# Patient Record
Sex: MALE | Race: WHITE | NOT HISPANIC OR LATINO | Employment: OTHER | URBAN - METROPOLITAN AREA
[De-identification: names, ages, dates, MRNs, and addresses within clinical notes are randomized per-mention and may not be internally consistent; named-entity substitution may affect disease eponyms.]

---

## 2017-01-12 ENCOUNTER — OFFICE VISIT (OUTPATIENT)
Dept: INTERNAL MEDICINE | Facility: CLINIC | Age: 37
End: 2017-01-12
Attending: INTERNAL MEDICINE
Payer: MEDICAID

## 2017-01-12 VITALS
WEIGHT: 259.5 LBS | OXYGEN SATURATION: 97 % | DIASTOLIC BLOOD PRESSURE: 84 MMHG | HEART RATE: 83 BPM | SYSTOLIC BLOOD PRESSURE: 118 MMHG | BODY MASS INDEX: 37.15 KG/M2 | HEIGHT: 70 IN

## 2017-01-12 DIAGNOSIS — R41.840 DIFFICULTY CONCENTRATING: Primary | ICD-10-CM

## 2017-01-12 DIAGNOSIS — L40.9 PSORIASIS: ICD-10-CM

## 2017-01-12 PROCEDURE — 99213 OFFICE O/P EST LOW 20 MIN: CPT | Mod: S$PBB,,, | Performed by: INTERNAL MEDICINE

## 2017-01-12 PROCEDURE — 99999 PR PBB SHADOW E&M-EST. PATIENT-LVL IV: CPT | Mod: PBBFAC,,, | Performed by: INTERNAL MEDICINE

## 2017-01-12 PROCEDURE — 99214 OFFICE O/P EST MOD 30 MIN: CPT | Mod: PBBFAC | Performed by: INTERNAL MEDICINE

## 2017-01-12 RX ORDER — CLOBETASOL PROPIONATE 0.5 MG/G
CREAM TOPICAL 2 TIMES DAILY
Qty: 15 G | Refills: 1 | Status: SHIPPED | OUTPATIENT
Start: 2017-01-12 | End: 2019-05-16 | Stop reason: SDUPTHER

## 2017-01-12 RX ORDER — PREDNISONE 10 MG/1
10 TABLET ORAL DAILY
Qty: 22 TABLET | Refills: 0 | Status: SHIPPED | OUTPATIENT
Start: 2017-01-12 | End: 2017-01-12 | Stop reason: SDUPTHER

## 2017-01-12 RX ORDER — PREDNISONE 10 MG/1
10 TABLET ORAL DAILY
Qty: 22 TABLET | Refills: 0 | Status: SHIPPED | OUTPATIENT
Start: 2017-01-12 | End: 2017-01-22

## 2017-01-12 NOTE — PROGRESS NOTES
"Subjective:       Patient ID: Wilver Lara is a 36 y.o. male.    Chief Complaint: Rash    HPI Comments: Here for routine f/u    Psoriasis   -has been flaring up for the past week on knees, elbows, and groin. He has had difficulty sleeping due to pain waking him up. He has not been using topical steroids as of late.    Also reports frequent forgetfulness and concentration issues. Would like to be tested for ADD      Review of Systems    Objective:      Vitals:    01/12/17 1509   BP: 118/84   Pulse: 83   SpO2: 97%   Weight: 117.7 kg (259 lb 7.7 oz)   Height: 5' 10" (1.778 m)      Physical Exam   Constitutional: He is oriented to person, place, and time. He appears well-developed and well-nourished. He does not have a sickly appearance. No distress.   HENT:   Head: Normocephalic and atraumatic.   Mouth/Throat: Oropharynx is clear and moist. No oropharyngeal exudate.   Eyes: Conjunctivae and EOM are normal. Pupils are equal, round, and reactive to light. Right eye exhibits no discharge. Left eye exhibits no discharge. No scleral icterus.   Neck: No thyromegaly present.   Cardiovascular: Normal rate, regular rhythm and normal heart sounds.    No murmur heard.  Pulmonary/Chest: Effort normal and breath sounds normal. No respiratory distress. He has no wheezes. He has no rales.   Abdominal: Soft. Normal appearance. He exhibits no distension. There is no tenderness.   Musculoskeletal: He exhibits no edema or tenderness.   Lymphadenopathy:     He has no cervical adenopathy.   Neurological: He is alert and oriented to person, place, and time.   Skin: Skin is warm and dry. He is not diaphoretic.   Diffuse scaling with erythematous base involving knees and elbows.   Psychiatric: He has a normal mood and affect. His speech is normal and behavior is normal.       Assessment:       1. Difficulty concentrating    2. Psoriasis        Plan:       Wilver was seen today for rash.    Diagnoses and all orders for this " visit:    Difficulty concentrating  -     Ambulatory consult to Psychiatry    Psoriasis  -given paper Rx for PO steroids in event symptoms are this severe over weekend and do not respond to topical  -     clobetasol (TEMOVATE) 0.05 % cream; Apply topically 2 (two) times daily.    -     Ambulatory referral to Dermatology               Side effects of medication(s) were discussed in detail and patient voiced understanding.  Patient will call back for any issues or complications.

## 2017-03-08 ENCOUNTER — HOSPITAL ENCOUNTER (OUTPATIENT)
Dept: RADIOLOGY | Facility: HOSPITAL | Age: 37
Discharge: HOME | End: 2017-03-08
Attending: INTERNAL MEDICINE
Payer: MEDICAID

## 2017-03-08 ENCOUNTER — OFFICE VISIT (OUTPATIENT)
Dept: HEMATOLOGY/ONCOLOGY | Facility: CLINIC | Age: 37
End: 2017-03-08
Payer: MEDICAID

## 2017-03-08 VITALS
RESPIRATION RATE: 20 BRPM | OXYGEN SATURATION: 99 % | WEIGHT: 253.5 LBS | SYSTOLIC BLOOD PRESSURE: 145 MMHG | HEART RATE: 70 BPM | TEMPERATURE: 98 F | BODY MASS INDEX: 36.38 KG/M2 | DIASTOLIC BLOOD PRESSURE: 89 MMHG

## 2017-03-08 DIAGNOSIS — R16.1 SPLENOMEGALY: Primary | ICD-10-CM

## 2017-03-08 DIAGNOSIS — R16.1 SPLENOMEGALY: ICD-10-CM

## 2017-03-08 PROCEDURE — 76700 US EXAM ABDOM COMPLETE: CPT | Mod: 26,,, | Performed by: RADIOLOGY

## 2017-03-08 PROCEDURE — 99999 PR PBB SHADOW E&M-EST. PATIENT-LVL III: CPT | Mod: PBBFAC,,, | Performed by: INTERNAL MEDICINE

## 2017-03-08 PROCEDURE — 99213 OFFICE O/P EST LOW 20 MIN: CPT | Mod: S$PBB,,, | Performed by: INTERNAL MEDICINE

## 2017-03-08 PROCEDURE — 99213 OFFICE O/P EST LOW 20 MIN: CPT | Mod: PBBFAC | Performed by: INTERNAL MEDICINE

## 2017-03-08 NOTE — PROGRESS NOTES
Subjective:       Patient ID: Wilver Lara is a 36 y.o. male.    Chief Complaint: Follow-up    HPI     Returns after scans- spleen stable - no other findings  Doing well    Findings:    Pancreas:  Normal.     Liver:  Normal in size.  The liver demonstrates homogeneous echotexture.  No focal hepatic lesions are seen.    Biliary system:  Gallbladder demonstrates a stable 5 mm echogenic, nonmobile focus along the gallbladder wall consistent with a polyp.  A small amount of layering sludge and a few tiny stones are visualized.  The common duct is not dilated, measuring 3 mm.  No gallbladder wall thickening.No sonographic Nguyen sign.  No dilated intrahepatic radicles are seen.  No pericholecystic fluid.    Spleen:  Slightly enlarged in size measuring 13.4 x 4.7 cm with a homogeneous echotexture.    Aorta:  No aneurysm.      Inferior vena cava:  Normal in appearance.    Right kidney:  Normal in size with no hydronephrosis.      Left kidney:  Surgically absent.  No evidence of mass in the left renal fossa.      Miscellaneous:  No ascites.   Impression       Stable gallbladder wall polyp measuring 5 mm.    A small amount of biliary sludge and a few tiny gallstones.    Mild splenomegaly.    Status post left nephrectomy.       Additional scans:  Repeat CT scan 9/6/16  Comparison: Ultrasound abdomen 6/21/16.  The visualized lower thorax shows no lung consolidation or pleural effusion.  The spleen is mildly enlarged with homogeneous parenchyma.  The 2 splenules are noted at the hilum.  The liver, gallbladder, bile ducts, portal vein, and pancreas are unremarkable.  Both adrenal glands are present and unremarkable.  Postoperative changes are identified from left nephrectomy.  The surgical bed is unremarkable with no sign of recurrence.  The right kidney is normal size and excretes contrast satisfactorily without evidence of stone, obstruction, or mass.  The right ureter and urinary bladder and prostate are  unremarkable.  The abdominal aorta tapers normally with all the major branch vessels patent.  No significant ascites or lymph node enlargement is seen in the abdomen or pelvis.  The intestinal tract shows no obstruction or acute inflammatory process.  Normal appendix is visualized.  The skeletal structures are intact.  No osseous metastatic disease is seen.  The muscles of the left flank show distortion and defect with herniation of abdominal fat and the descending colon into the area.     We originally saw in consultation for splenomegaly on ultarsound  Weight stable. Patient reports no fevers, chills, or night sweats.  No lymphadenopathy or itching noted.  Energy level unchanged.  Prior lab work-up negative.      He has a prior history of an incidentally found renal cell cancer for which he underwent left nephrectomy on 6/3/15- required open procedure due to depth and size of tumor  Pathology revealed Stage: I (pT1b pNX)  1. KIDNEY: RENAL CELL (CLEAR CELL) CARCINOMA (4.5 CM); RESECTION MARGINS FREE OF  MALIGNANCY.  2. ADIPOSE TISSUE: FAT NECROSIS, FIBROSIS, AND HEMOSIDERIN DEPOSITS; NO EVIDENCE OF  MALIGNANCY.  6/26/15 U/S- there is mild splenomegaly measuring up to 13.9 cm.  9/25/15: 1. Mild splenomegaly- 13.7 cm  2. Findings that likely represent 4-mm gallbladder polyp.  3.  Patient status post left nephrectomy  He was advised to see general surgeon at that time  12/14/15- 13.7 cm SM  6/21/15- 1. Stable borderline splenomegaly.  2.  Left nephrectomy.  3.  Stable gallbladder polyp.    Review of Systems   Constitutional: Negative for activity change, appetite change, chills, diaphoresis, fatigue, fever and unexpected weight change.   HENT: Negative for congestion, ear pain, mouth sores, nosebleeds, postnasal drip, rhinorrhea, sinus pressure, sneezing, sore throat, tinnitus and trouble swallowing.    Eyes: Negative for redness and visual disturbance.   Respiratory: Negative for cough, chest tightness, shortness of  breath and wheezing.    Cardiovascular: Negative for chest pain, palpitations and leg swelling.   Gastrointestinal: Negative for abdominal distention, abdominal pain, blood in stool, constipation, diarrhea, nausea and vomiting.   Endocrine: Negative for cold intolerance and heat intolerance.   Genitourinary: Negative for decreased urine volume, difficulty urinating, dysuria, frequency and urgency.   Musculoskeletal: Negative for arthralgias, back pain, gait problem, joint swelling, myalgias, neck pain and neck stiffness.   Skin: Negative for color change, pallor, rash and wound.   Neurological: Negative for dizziness, weakness, light-headedness, numbness and headaches.   Hematological: Negative for adenopathy. Does not bruise/bleed easily.   Psychiatric/Behavioral: Negative for decreased concentration and dysphoric mood. The patient is not nervous/anxious.        Objective:      Physical Exam   Constitutional: He is oriented to person, place, and time. He appears well-developed and well-nourished. No distress.   Presents alone   HENT:   Head: Normocephalic and atraumatic.   Right Ear: External ear normal.   Left Ear: External ear normal.   Nose: Nose normal.   Mouth/Throat: Oropharynx is clear and moist. No oropharyngeal exudate.   Eyes: Conjunctivae and EOM are normal. Pupils are equal, round, and reactive to light. Left eye exhibits no discharge. No scleral icterus.   Neck: Normal range of motion. Neck supple. No tracheal deviation present. No thyromegaly present.   Cardiovascular: Normal rate, regular rhythm, normal heart sounds and intact distal pulses.  Exam reveals no gallop and no friction rub.    No murmur heard.  Pulmonary/Chest: Effort normal and breath sounds normal. No respiratory distress. He has no wheezes. He has no rales. He exhibits no tenderness.   Abdominal: Soft. Bowel sounds are normal. He exhibits no distension and no mass. There is no tenderness. There is no rebound and no guarding.   No  organomegaly   Musculoskeletal: He exhibits no edema or tenderness.   Lymphadenopathy:     He has no cervical adenopathy.     He has no axillary adenopathy.        Right: No inguinal and no supraclavicular adenopathy present.        Left: No inguinal and no supraclavicular adenopathy present.   Neurological: He is alert and oriented to person, place, and time. No cranial nerve deficit. He exhibits normal muscle tone. Coordination normal.   Skin: Skin is warm and dry. No rash noted. He is not diaphoretic. No erythema. No pallor.   Psychiatric: He has a normal mood and affect. His behavior is normal. Judgment and thought content normal.   Nursing note and vitals reviewed.    Labs- reviewed  Assessment:       1. Splenomegaly        Plan:     Stable findings  No further follow-up needed  Will follow with urology regarding RCC

## 2017-05-23 ENCOUNTER — HOSPITAL ENCOUNTER (EMERGENCY)
Facility: OTHER | Age: 37
Discharge: HOME | End: 2017-05-23
Attending: EMERGENCY MEDICINE
Payer: MEDICAID

## 2017-05-23 VITALS
TEMPERATURE: 99 F | RESPIRATION RATE: 18 BRPM | WEIGHT: 250 LBS | BODY MASS INDEX: 35.79 KG/M2 | DIASTOLIC BLOOD PRESSURE: 83 MMHG | HEART RATE: 94 BPM | OXYGEN SATURATION: 98 % | HEIGHT: 70 IN | SYSTOLIC BLOOD PRESSURE: 130 MMHG

## 2017-05-23 DIAGNOSIS — R53.83 FATIGUE, UNSPECIFIED TYPE: ICD-10-CM

## 2017-05-23 DIAGNOSIS — R35.0 URINARY FREQUENCY: Primary | ICD-10-CM

## 2017-05-23 LAB
ALBUMIN SERPL BCP-MCNC: 3.6 G/DL
ALP SERPL-CCNC: 97 U/L
ALT SERPL W/O P-5'-P-CCNC: 28 U/L
ANION GAP SERPL CALC-SCNC: 11 MMOL/L
AST SERPL-CCNC: 19 U/L
BASOPHILS # BLD AUTO: 0.03 K/UL
BASOPHILS NFR BLD: 0.2 %
BILIRUB SERPL-MCNC: 0.5 MG/DL
BILIRUB UR QL STRIP: NEGATIVE
BUN SERPL-MCNC: 12 MG/DL
CALCIUM SERPL-MCNC: 9.1 MG/DL
CHLORIDE SERPL-SCNC: 106 MMOL/L
CK SERPL-CCNC: 177 U/L
CLARITY UR: CLEAR
CO2 SERPL-SCNC: 22 MMOL/L
COLOR UR: YELLOW
CREAT SERPL-MCNC: 1.1 MG/DL
DIFFERENTIAL METHOD: ABNORMAL
EOSINOPHIL # BLD AUTO: 0.4 K/UL
EOSINOPHIL NFR BLD: 2.8 %
ERYTHROCYTE [DISTWIDTH] IN BLOOD BY AUTOMATED COUNT: 13 %
EST. GFR  (AFRICAN AMERICAN): >60 ML/MIN/1.73 M^2
EST. GFR  (NON AFRICAN AMERICAN): >60 ML/MIN/1.73 M^2
GLUCOSE SERPL-MCNC: 110 MG/DL
GLUCOSE UR QL STRIP: NEGATIVE
HCT VFR BLD AUTO: 43.8 %
HGB BLD-MCNC: 14.7 G/DL
HGB UR QL STRIP: ABNORMAL
KETONES UR QL STRIP: NEGATIVE
LEUKOCYTE ESTERASE UR QL STRIP: NEGATIVE
LYMPHOCYTES # BLD AUTO: 1.4 K/UL
LYMPHOCYTES NFR BLD: 11.2 %
MCH RBC QN AUTO: 29 PG
MCHC RBC AUTO-ENTMCNC: 33.6 %
MCV RBC AUTO: 86 FL
MONOCYTES # BLD AUTO: 0.9 K/UL
MONOCYTES NFR BLD: 7 %
NEUTROPHILS # BLD AUTO: 10 K/UL
NEUTROPHILS NFR BLD: 78.5 %
NITRITE UR QL STRIP: NEGATIVE
PH UR STRIP: 6 [PH] (ref 5–8)
PLATELET # BLD AUTO: 255 K/UL
PMV BLD AUTO: 9.5 FL
POTASSIUM SERPL-SCNC: 4.3 MMOL/L
PROT SERPL-MCNC: 8 G/DL
PROT UR QL STRIP: ABNORMAL
RBC # BLD AUTO: 5.07 M/UL
SODIUM SERPL-SCNC: 139 MMOL/L
SP GR UR STRIP: >=1.03 (ref 1–1.03)
URN SPEC COLLECT METH UR: ABNORMAL
UROBILINOGEN UR STRIP-ACNC: ABNORMAL EU/DL
WBC # BLD AUTO: 12.74 K/UL

## 2017-05-23 PROCEDURE — 80053 COMPREHEN METABOLIC PANEL: CPT

## 2017-05-23 PROCEDURE — 25000003 PHARM REV CODE 250: Performed by: PHYSICIAN ASSISTANT

## 2017-05-23 PROCEDURE — 85025 COMPLETE CBC W/AUTO DIFF WBC: CPT

## 2017-05-23 PROCEDURE — 87086 URINE CULTURE/COLONY COUNT: CPT

## 2017-05-23 PROCEDURE — 96360 HYDRATION IV INFUSION INIT: CPT

## 2017-05-23 PROCEDURE — 99283 EMERGENCY DEPT VISIT LOW MDM: CPT | Mod: 25

## 2017-05-23 PROCEDURE — 81003 URINALYSIS AUTO W/O SCOPE: CPT

## 2017-05-23 PROCEDURE — 82550 ASSAY OF CK (CPK): CPT

## 2017-05-23 RX ORDER — SODIUM CHLORIDE 9 MG/ML
1000 INJECTION, SOLUTION INTRAVENOUS
Status: COMPLETED | OUTPATIENT
Start: 2017-05-23 | End: 2017-05-23

## 2017-05-23 RX ADMIN — SODIUM CHLORIDE 1000 ML: 0.9 INJECTION, SOLUTION INTRAVENOUS at 05:05

## 2017-05-23 RX ADMIN — SODIUM CHLORIDE 1000 ML: 0.9 INJECTION, SOLUTION INTRAVENOUS at 06:05

## 2017-05-23 NOTE — ED TRIAGE NOTES
Pt reports fatigue and frequent urination since yesterday. Reports prior UTI 1 yr and 4 months ago and reports similar feeling. Reports generalized body aches.

## 2017-05-23 NOTE — ED PROVIDER NOTES
"Encounter Date: 5/23/2017       History     Chief Complaint   Patient presents with    Urinary Frequency     pt reports "I think I'm getting a UTI. The last time this happened I got admitted for about 7 days." denies any dysuria/hematuria; denies any groin pain; reports increase in urinary frequency and c/o feeling fatigue     Review of patient's allergies indicates:  No Known Allergies  Patient is a 36 y.o. male with a past medical history of left-sided renal cell carcinoma status post nephrectomy, presenting to the emergency department with complaints of fatigue, urinary frequency. The patient reports his symptoms started yesterday.  He reports that he has significant urinary frequency with no hematuria or dysuria.  He also states that he has generalized body aches and fatigue.  He reports he is concerned because the last time he had the symptoms in January 2016, he went to [facility] where he was found to have a very significant urinary tract infection and required a 7 day hospital stay. He denies any pain at this time.  He denies fever, chills, nausea, vomiting, diarrhea. He reports that Dr. Mariscal is his urologist, he is due to see him in 2 months.       The history is provided by the patient.     Past Medical History:   Diagnosis Date    Cancer     left kidney mass     Past Surgical History:   Procedure Laterality Date    ANKLE SURGERY Left     bone spurs    NEPHRECTOMY      rt knee meniscus       History reviewed. No pertinent family history.  Social History   Substance Use Topics    Smoking status: Never Smoker    Smokeless tobacco: Not on file    Alcohol use No     Review of Systems   Constitutional: Positive for fatigue. Negative for activity change, chills and fever.   HENT: Negative for congestion, rhinorrhea and sore throat.    Eyes: Negative for photophobia and visual disturbance.   Respiratory: Negative for cough and shortness of breath.    Cardiovascular: Negative for chest " pain.   Gastrointestinal: Negative for abdominal pain, diarrhea, nausea and vomiting.   Genitourinary: Positive for frequency. Negative for dysuria, hematuria and urgency.   Musculoskeletal: Positive for myalgias. Negative for back pain and neck pain.   Skin: Negative for color change and wound.   Neurological: Negative for weakness and headaches.   Psychiatric/Behavioral: Negative for agitation and confusion.       Physical Exam     Initial Vitals [05/23/17 1608]   BP Pulse Resp Temp SpO2   (!) 148/84 100 18 97.9 °F (36.6 °C) 97 %     Physical Exam    Nursing note and vitals reviewed.  Constitutional: He appears well-developed and well-nourished. He is not diaphoretic. He is cooperative.  Non-toxic appearance. He does not have a sickly appearance. No distress.   Well appearing,  male unaccompanied in the emergency department.  He speaking in clear and full sentences.  He is in no acute distress.  He ambulates without that difficulty.   HENT:   Head: Normocephalic and atraumatic.   Right Ear: External ear normal.   Left Ear: External ear normal.   Nose: Nose normal.   Mouth/Throat: Oropharynx is clear and moist.   Eyes: Conjunctivae and EOM are normal.   Neck: Normal range of motion. Neck supple.   Cardiovascular: Regular rhythm and normal heart sounds. Tachycardia present.    Pulmonary/Chest: Breath sounds normal. No respiratory distress. He has no wheezes.   Abdominal: Soft. Bowel sounds are normal. He exhibits no distension. There is no tenderness. There is no rebound, no guarding and no CVA tenderness.   Musculoskeletal: Normal range of motion.   Neurological: He is alert and oriented to person, place, and time. GCS eye subscore is 4. GCS verbal subscore is 5. GCS motor subscore is 6.   Skin: Skin is warm.   Psychiatric: He has a normal mood and affect. His behavior is normal. Judgment and thought content normal.         ED Course   Procedures  Labs Reviewed   URINALYSIS - Abnormal; Notable for the  following:        Result Value    Specific Gravity, UA >=1.030 (*)     Protein, UA Trace (*)     Occult Blood UA Trace (*)     Urobilinogen, UA 2.0-3.0 (*)     All other components within normal limits   CBC W/ AUTO DIFFERENTIAL - Abnormal; Notable for the following:     WBC 12.74 (*)     Gran # 10.0 (*)     Gran% 78.5 (*)     Lymph% 11.2 (*)     All other components within normal limits   COMPREHENSIVE METABOLIC PANEL - Abnormal; Notable for the following:     CO2 22 (*)     All other components within normal limits   CULTURE, URINE   CK             Medical Decision Making:   History:   Old Medical Records: I decided to obtain old medical records.  Old Records Summarized: other records.       <> Summary of Records: Reviewed medical records in epic including discharge summary from 8/24/16 where patient was admitted for ZULLY with rhabdo.  Initial Assessment:   Urgent evaluation of a 36 y.o. male with a past medical history of left renal cell carcinoma status post nephrectomy, presenting to the emergency department with complaints of urinary frequency, fatigue and myalgias x2 days. Physical exam reveals mild tachycardia, lungs are clear to auscultation bilaterally.  Abdomen is soft nontender with no CVA tenderness.  Given the patient's history and physical exam finds, will obtain UA, basic blood work including CBC, CMP, CK, administer IV fluids and reassess.  Clinical Tests:   Lab Tests: Ordered and Reviewed  The following lab test(s) were unremarkable: CBC, CMP and Urinalysis       <> Summary of Lab: CPK  ED Management:  CBC shows WBC at 12.74, H&H is 14.7/43.7. CPK is 177.  UA shows trace protein with trace blood, no evidence of acute urinary tract infection.  Will send urine culture.  After receiving 2 L of fluids, the patient reported much relief of his symptoms.  He states he feels as though he cannot go home.  We'll plan to discharge home with symptomatic care instructions.  I did advise him to obtain close  follow-up with Dr. Mariscal. The patient was instructed to follow up with a primary care provider in 2 days or to return to the emergency department for worsening symptoms. The treatment plan was discussed with the patient who demonstrated understanding and comfort with plan. The patient's history, physical exam, and plan of care was discussed with and agreed upon with my supervising physician.     Other:   I have discussed this case with another health care provider.       <> Summary of the Discussion: Dr. Banks  This note was created using Dragon Medical Dictation. There may be typographical errors secondary to dictation.                    ED Course     Clinical Impression:     1. Urinary frequency    2. Fatigue, unspecified type       Disposition:   Disposition: Discharged  Condition: Stable       Isabel Beach PA-C  05/23/17 9059

## 2017-05-25 LAB — BACTERIA UR CULT: NORMAL

## 2017-07-10 ENCOUNTER — TELEPHONE (OUTPATIENT)
Dept: UROLOGY | Facility: CLINIC | Age: 37
End: 2017-07-10

## 2017-07-10 DIAGNOSIS — C64.9 CANCER OF KIDNEY, UNSPECIFIED LATERALITY: Primary | ICD-10-CM

## 2017-07-10 NOTE — TELEPHONE ENCOUNTER
----- Message from Natacha Mei sent at 7/10/2017 10:29 AM CDT -----  Contact: Betsy Lara pt's wife  x_  1st Request  _  2nd Request  _  3rd Request        Who: Betsy Lara pt's wife    Why: Patient wife would like a call back in regards to getting orders placed and scheduled for a CT scan, X-ray, and blood work before her 's annual appt on 7/14.     What Number to Call Back: [phone]    When to Expect a call back: (Before the end of the day)   -- if call after 3:00 call back will be tomorrow.

## 2017-07-10 NOTE — TELEPHONE ENCOUNTER
LMOR that all pt needs is CXR and that he can have this done as walk-in at any [facility] facility.

## 2017-07-13 ENCOUNTER — HOSPITAL ENCOUNTER (OUTPATIENT)
Dept: RADIOLOGY | Facility: HOSPITAL | Age: 37
Discharge: HOME | End: 2017-07-13
Attending: UROLOGY
Payer: MEDICAID

## 2017-07-13 DIAGNOSIS — C64.9 CANCER OF KIDNEY, UNSPECIFIED LATERALITY: ICD-10-CM

## 2017-07-13 PROCEDURE — 71020 XR CHEST PA AND LATERAL: CPT | Mod: 26,,, | Performed by: RADIOLOGY

## 2017-07-13 PROCEDURE — 71020 XR CHEST PA AND LATERAL: CPT | Mod: TC

## 2017-07-14 ENCOUNTER — OFFICE VISIT (OUTPATIENT)
Dept: UROLOGY | Facility: CLINIC | Age: 37
End: 2017-07-14
Attending: UROLOGY
Payer: MEDICAID

## 2017-07-14 VITALS
SYSTOLIC BLOOD PRESSURE: 122 MMHG | WEIGHT: 248.56 LBS | DIASTOLIC BLOOD PRESSURE: 79 MMHG | BODY MASS INDEX: 35.58 KG/M2 | HEART RATE: 69 BPM | HEIGHT: 70 IN

## 2017-07-14 DIAGNOSIS — C64.9 CANCER OF KIDNEY, UNSPECIFIED LATERALITY: Primary | ICD-10-CM

## 2017-07-14 PROCEDURE — 99999 PR PBB SHADOW E&M-EST. PATIENT-LVL III: CPT | Mod: PBBFAC,,, | Performed by: UROLOGY

## 2017-07-14 PROCEDURE — 99214 OFFICE O/P EST MOD 30 MIN: CPT | Mod: S$PBB,,, | Performed by: UROLOGY

## 2017-07-14 PROCEDURE — 99213 OFFICE O/P EST LOW 20 MIN: CPT | Mod: PBBFAC | Performed by: UROLOGY

## 2017-07-14 NOTE — PROGRESS NOTES
"Subjective:      Wilver Lara is a 36 y.o. male who returns today regarding his     2 years status post left nephrectomy for renal cell carcinoma.  No symptoms of metastatic disease nor local recurrence.  He has seen hematology for his splenomegaly and no additional workup is needed..    The following portions of the patient's history were reviewed and updated as appropriate: allergies, current medications, past family history, past medical history, past social history, past surgical history and problem list.    Review of Systems  Pertinent items are noted in HPI.  A comprehensive multipoint review of systems was negative except as otherwise stated in the HPI.     Objective:   Vitals: /79   Pulse 69   Ht 5' 10" (1.778 m)   Wt 112.8 kg (248 lb 9.1 oz)   BMI 35.67 kg/m²     Physical Exam   General: alert and oriented, no acute distress  Respiratory: Symmetric expansion, non-labored breathing  Cardiovascular: no peripheral edema  Abdomen: soft, non distended  Skin: normal coloration and turgor, no rashes, no suspicious skin lesions noted  Neuro: no gross deficits  Psych: normal judgment and insight, normal mood/affect and non-anxious    Physical Exam    Lab Review   Urinalysis demonstrates   Lab Results   Component Value Date    WBC 12.74 (H) 05/23/2017    HGB 14.7 05/23/2017    HCT 43.8 05/23/2017    MCV 86 05/23/2017     05/23/2017     Lab Results   Component Value Date    CREATININE 1.1 05/23/2017    BUN 12 05/23/2017     Function tests normal  Imaging    Chest x-ray no metastases   abdominal ultrasound no evidence of recurrence or metastatic disease within the abdomen    Assessment and Plan:   Cancer of kidney, unspecified laterality  grade I clear cell [id number] sp left nephrectomy YANA  -     US Abdomen Complete; Future; Expected date: 07/14/2017  -     X-Ray Chest PA And Lateral; Future; Expected date: 07/14/2017  -     Comprehensive metabolic panel; Future; Expected date: 07/14/2017  -     " CBC auto differential; Future; Expected date: 07/14/2017      Gallbladder polyp  Follow-up with Dr. Hess his primary physician for gallbladder polyp, splenomegaly and other non-urologic issues  With a solitary kidney, he should be monitored for hypertension and proteinuria and consider nephrology referral if he develops any of these

## 2018-01-05 ENCOUNTER — OFFICE VISIT (OUTPATIENT)
Dept: URGENT CARE | Facility: CLINIC | Age: 38
End: 2018-01-05
Payer: MEDICAID

## 2018-01-05 VITALS
DIASTOLIC BLOOD PRESSURE: 79 MMHG | SYSTOLIC BLOOD PRESSURE: 126 MMHG | BODY MASS INDEX: 35.79 KG/M2 | WEIGHT: 250 LBS | RESPIRATION RATE: 19 BRPM | HEIGHT: 70 IN | TEMPERATURE: 98 F | HEART RATE: 84 BPM | OXYGEN SATURATION: 98 %

## 2018-01-05 DIAGNOSIS — J40 PRODUCTIVE BRONCHITIS: Primary | ICD-10-CM

## 2018-01-05 PROCEDURE — 99214 OFFICE O/P EST MOD 30 MIN: CPT | Mod: S$GLB,,, | Performed by: PHYSICIAN ASSISTANT

## 2018-01-05 RX ORDER — AZITHROMYCIN 250 MG/1
TABLET, FILM COATED ORAL
Qty: 6 TABLET | Refills: 0 | Status: SHIPPED | OUTPATIENT
Start: 2018-01-05 | End: 2018-01-10

## 2018-01-05 RX ORDER — PROMETHAZINE HYDROCHLORIDE AND DEXTROMETHORPHAN HYDROBROMIDE 6.25; 15 MG/5ML; MG/5ML
5 SYRUP ORAL
Qty: 118 ML | Refills: 0 | Status: SHIPPED | OUTPATIENT
Start: 2018-01-05 | End: 2018-01-12

## 2018-01-05 NOTE — PROGRESS NOTES
"Subjective:       Patient ID: Wilver Lara is a 37 y.o. male.    Vitals:  height is 5' 10" (1.778 m) and weight is 113.4 kg (250 lb). His temperature is 97.6 °F (36.4 °C). His blood pressure is 126/79 and his pulse is 84. His respiration is 19 and oxygen saturation is 98%.     Chief Complaint: Cough    Cough   This is a new problem. The current episode started more than 1 month ago. The problem has been unchanged. The problem occurs every few minutes. The cough is productive of sputum. Associated symptoms include ear pain, headaches and myalgias. Pertinent negatives include no chest pain, chills, eye redness, fever, sore throat, shortness of breath or wheezing. The symptoms are aggravated by lying down. Treatments tried: mucinex. The treatment provided no relief.     Review of Systems   Constitution: Negative for chills, fever and malaise/fatigue.   HENT: Positive for congestion and ear pain. Negative for hoarse voice and sore throat.    Eyes: Negative for discharge and redness.   Cardiovascular: Negative for chest pain, dyspnea on exertion and leg swelling.   Respiratory: Positive for cough. Negative for shortness of breath, sputum production and wheezing.    Musculoskeletal: Positive for myalgias.   Gastrointestinal: Negative for abdominal pain and nausea.   Neurological: Positive for headaches.       Objective:      Physical Exam   Constitutional: He is oriented to person, place, and time. He appears well-developed and well-nourished. He is cooperative.  Non-toxic appearance. He does not appear ill. No distress.   HENT:   Head: Normocephalic and atraumatic.   Right Ear: Hearing, tympanic membrane, external ear and ear canal normal.   Left Ear: Hearing, tympanic membrane, external ear and ear canal normal.   Nose: Mucosal edema present. No rhinorrhea or nasal deformity. No epistaxis. Right sinus exhibits no maxillary sinus tenderness and no frontal sinus tenderness. Left sinus exhibits no maxillary sinus " tenderness and no frontal sinus tenderness.   Mouth/Throat: Uvula is midline, oropharynx is clear and moist and mucous membranes are normal. No trismus in the jaw. Normal dentition. No uvula swelling. No posterior oropharyngeal erythema.   Eyes: Conjunctivae and lids are normal. No scleral icterus.   Sclera clear bilat   Neck: Trachea normal, full passive range of motion without pain and phonation normal. Neck supple. No neck rigidity.   Cardiovascular: Normal rate, regular rhythm, normal heart sounds, intact distal pulses and normal pulses.    Pulmonary/Chest: Effort normal and breath sounds normal. No respiratory distress.   Productive cough on exam   Musculoskeletal: Normal range of motion. He exhibits no edema or deformity.   Neurological: He is alert and oriented to person, place, and time. He exhibits normal muscle tone. Coordination normal.   Skin: Skin is warm, dry and intact. He is not diaphoretic. No pallor.   Psychiatric: He has a normal mood and affect. His speech is normal and behavior is normal. Judgment and thought content normal. Cognition and memory are normal.   Nursing note and vitals reviewed.      Assessment:       1. Productive bronchitis        Plan:         Productive bronchitis  -     azithromycin (Z-MYRA) 250 MG tablet; Take 2 tablets by mouth on day 1; Take 1 tablet by mouth on days 2-5  Dispense: 6 tablet; Refill: 0  -     promethazine-dextromethorphan (PROMETHAZINE-DM) 6.25-15 mg/5 mL Syrp; Take 5 mLs by mouth every 4 to 6 hours as needed.  Dispense: 118 mL; Refill: 0          Patient Instructions     Please return here or go to the Emergency Department for any concerns or worsening of condition.  If you were prescribed antibiotics, please take them to completion.  If you were prescribed a narcotic medication, do not drive or operate heavy equipment or machinery while taking these medications.  Please follow up with your primary care doctor or specialist as needed.    If you  smoke,  please stop smoking.    Symptomatic treatment:      Acute Bronchitis  Your healthcare provider has told you that you have acute bronchitis. Bronchitis is infection or inflammation of the bronchial tubes (airways in the lungs). Normally, air moves easily in and out of the airways. Bronchitis narrows the airways, making it harder for air to flow in and out of the lungs. This causes symptoms such as shortness of breath, coughing up yellow or green mucus, and wheezing. Bronchitis can be acute or chronic. Acute means the condition comes on quickly and goes away in a short time, usually within 3 to 10 days. Chronic means a condition lasts a long time and often comes back.    What causes acute bronchitis?  Acute bronchitis almost always starts as a viral respiratory infection, such as a cold or the flu. Certain factors make it more likely for a cold or flu to turn into bronchitis. These include being very young, being elderly, having a heart or lung problem, or having a weak immune system. Cigarette smoking also makes bronchitis more likely.  When bronchitis develops, the airways become swollen. The airways may also become infected with bacteria. This is known as a secondary infection.  Diagnosing acute bronchitis  Your healthcare provider will examine you and ask about your symptoms and health history. You may also have a sputum culture to test the fluid in your lungs. Chest X-rays may be done to look for infection in the lungs.  Treating acute bronchitis  Bronchitis usually clears up as the cold or flu goes away. You can help feel better faster by doing the following:  · Take medicine as directed. You may be told to take ibuprofen or other over-the-counter medicines. These help relieve inflammation in your bronchial tubes. Your healthcare provider may prescribe an inhaler to help open up the bronchial tubes. Most of the time, acute bronchitis is caused by a viral infection. Antibiotics are usually not prescribed for  viral infections.  · Drink plenty of fluids, such as water, juice, or warm soup. Fluids loosen mucus so that you can cough it up. This helps you breathe more easily. Fluids also prevent dehydration.  · Make sure you get plenty of rest.  · Do not smoke. Do not allow anyone else to smoke in your home.  Recovery and follow-up  Follow up with your doctor as you are told. You will likely feel better in a week or two. But a dry cough can linger beyond that time. Let your doctor know if you still have symptoms (other than a dry cough) after 2 weeks, or if youre prone to getting bronchial infections. Take steps to protect yourself from future infections. These steps include stopping smoking and avoiding tobacco smoke, washing your hands often, and getting a yearly flu shot.  When to call your healthcare provider  Call the healthcare provider if you have any of the following:  · Fever of 100.4°F (38.0°C) or higher, or as advised  · Symptoms that get worse, or new symptoms  · Trouble breathing  · Symptoms that dont start to improve within a week, or within 3 days of taking antibiotics   Date Last Reviewed: 12/1/2016  © 3717-3325 [employer]. [address] [place] [address], [place] [address]. All rights reserved. This information is not intended as a substitute for professional medical care. Always follow your healthcare professional's instructions.

## 2018-01-05 NOTE — PATIENT INSTRUCTIONS
Please return here or go to the Emergency Department for any concerns or worsening of condition.  If you were prescribed antibiotics, please take them to completion.  If you were prescribed a narcotic medication, do not drive or operate heavy equipment or machinery while taking these medications.  Please follow up with your primary care doctor or specialist as needed.    If you  smoke, please stop smoking.    Symptomatic treatment:      Acute Bronchitis  Your healthcare provider has told you that you have acute bronchitis. Bronchitis is infection or inflammation of the bronchial tubes (airways in the lungs). Normally, air moves easily in and out of the airways. Bronchitis narrows the airways, making it harder for air to flow in and out of the lungs. This causes symptoms such as shortness of breath, coughing up yellow or green mucus, and wheezing. Bronchitis can be acute or chronic. Acute means the condition comes on quickly and goes away in a short time, usually within 3 to 10 days. Chronic means a condition lasts a long time and often comes back.    What causes acute bronchitis?  Acute bronchitis almost always starts as a viral respiratory infection, such as a cold or the flu. Certain factors make it more likely for a cold or flu to turn into bronchitis. These include being very young, being elderly, having a heart or lung problem, or having a weak immune system. Cigarette smoking also makes bronchitis more likely.  When bronchitis develops, the airways become swollen. The airways may also become infected with bacteria. This is known as a secondary infection.  Diagnosing acute bronchitis  Your healthcare provider will examine you and ask about your symptoms and health history. You may also have a sputum culture to test the fluid in your lungs. Chest X-rays may be done to look for infection in the lungs.  Treating acute bronchitis  Bronchitis usually clears up as the cold or flu goes away. You can help feel better  faster by doing the following:  · Take medicine as directed. You may be told to take ibuprofen or other over-the-counter medicines. These help relieve inflammation in your bronchial tubes. Your healthcare provider may prescribe an inhaler to help open up the bronchial tubes. Most of the time, acute bronchitis is caused by a viral infection. Antibiotics are usually not prescribed for viral infections.  · Drink plenty of fluids, such as water, juice, or warm soup. Fluids loosen mucus so that you can cough it up. This helps you breathe more easily. Fluids also prevent dehydration.  · Make sure you get plenty of rest.  · Do not smoke. Do not allow anyone else to smoke in your home.  Recovery and follow-up  Follow up with your doctor as you are told. You will likely feel better in a week or two. But a dry cough can linger beyond that time. Let your doctor know if you still have symptoms (other than a dry cough) after 2 weeks, or if youre prone to getting bronchial infections. Take steps to protect yourself from future infections. These steps include stopping smoking and avoiding tobacco smoke, washing your hands often, and getting a yearly flu shot.  When to call your healthcare provider  Call the healthcare provider if you have any of the following:  · Fever of 100.4°F (38.0°C) or higher, or as advised  · Symptoms that get worse, or new symptoms  · Trouble breathing  · Symptoms that dont start to improve within a week, or within 3 days of taking antibiotics   Date Last Reviewed: 12/1/2016  © 5439-4795 [employer]. [address] [place] [address], [place] [address]. All rights reserved. This information is not intended as a substitute for professional medical care. Always follow your healthcare professional's instructions.

## 2018-05-21 ENCOUNTER — TELEPHONE (OUTPATIENT)
Dept: UROLOGY | Facility: CLINIC | Age: 38
End: 2018-05-21

## 2018-05-21 NOTE — TELEPHONE ENCOUNTER
----- Message from Vicky Jacobs sent at 5/21/2018 10:57 AM CDT -----  Contact: Betsy Lara   Name of Who is Calling: Betsy Lara       What is the request in detail: Patients wife Betsy states she would like to speak with the staff to schedule her husbands annual check up (No slots were available)...... Please contact to further discuss and advise       Can the clinic reply by [facility]: No    What Number to Call Back if not in [facility]: [phone]

## 2018-05-23 ENCOUNTER — TELEPHONE (OUTPATIENT)
Dept: UROLOGY | Facility: CLINIC | Age: 38
End: 2018-05-23

## 2018-05-23 NOTE — TELEPHONE ENCOUNTER
----- Message from Lilliana Reardon sent at 5/23/2018  2:15 PM CDT -----  Contact: wife            Name of Who is Calling: ERIC MARKS [[id number]]      What is the request in detail: pt's wife calling because pt loosing insurance .. Pt would like to be seen for yearly sooner.. Please advise      Can the clinic reply by [facility]: no    What [facility]: [phone]

## 2018-06-06 ENCOUNTER — LAB VISIT (OUTPATIENT)
Dept: LAB | Facility: HOSPITAL | Age: 38
End: 2018-06-06
Attending: UROLOGY
Payer: MEDICAID

## 2018-06-06 DIAGNOSIS — C64.9 CANCER OF KIDNEY, UNSPECIFIED LATERALITY: ICD-10-CM

## 2018-06-06 LAB
ALBUMIN SERPL BCP-MCNC: 3.7 G/DL
ALP SERPL-CCNC: 96 U/L
ALT SERPL W/O P-5'-P-CCNC: 48 U/L
ANION GAP SERPL CALC-SCNC: 7 MMOL/L
AST SERPL-CCNC: 28 U/L
BASOPHILS # BLD AUTO: 0.04 K/UL
BASOPHILS NFR BLD: 0.5 %
BILIRUB SERPL-MCNC: 0.6 MG/DL
BUN SERPL-MCNC: 15 MG/DL
CALCIUM SERPL-MCNC: 9.1 MG/DL
CHLORIDE SERPL-SCNC: 108 MMOL/L
CO2 SERPL-SCNC: 24 MMOL/L
CREAT SERPL-MCNC: 1.4 MG/DL
DIFFERENTIAL METHOD: NORMAL
EOSINOPHIL # BLD AUTO: 0.4 K/UL
EOSINOPHIL NFR BLD: 4.6 %
ERYTHROCYTE [DISTWIDTH] IN BLOOD BY AUTOMATED COUNT: 12.9 %
EST. GFR  (AFRICAN AMERICAN): >60 ML/MIN/1.73 M^2
EST. GFR  (NON AFRICAN AMERICAN): >60 ML/MIN/1.73 M^2
GLUCOSE SERPL-MCNC: 116 MG/DL
HCT VFR BLD AUTO: 42.1 %
HGB BLD-MCNC: 14.6 G/DL
LYMPHOCYTES # BLD AUTO: 1.9 K/UL
LYMPHOCYTES NFR BLD: 23.7 %
MCH RBC QN AUTO: 29.4 PG
MCHC RBC AUTO-ENTMCNC: 34.7 G/DL
MCV RBC AUTO: 85 FL
MONOCYTES # BLD AUTO: 0.5 K/UL
MONOCYTES NFR BLD: 6.6 %
NEUTROPHILS # BLD AUTO: 5.2 K/UL
NEUTROPHILS NFR BLD: 64.6 %
PLATELET # BLD AUTO: 263 K/UL
PMV BLD AUTO: 9.6 FL
POTASSIUM SERPL-SCNC: 3.9 MMOL/L
PROT SERPL-MCNC: 7.4 G/DL
RBC # BLD AUTO: 4.96 M/UL
SODIUM SERPL-SCNC: 139 MMOL/L
WBC # BLD AUTO: 8.01 K/UL

## 2018-06-06 PROCEDURE — 36415 COLL VENOUS BLD VENIPUNCTURE: CPT

## 2018-06-06 PROCEDURE — 80053 COMPREHEN METABOLIC PANEL: CPT

## 2018-06-06 PROCEDURE — 85025 COMPLETE CBC W/AUTO DIFF WBC: CPT

## 2018-06-07 ENCOUNTER — OFFICE VISIT (OUTPATIENT)
Dept: UROLOGY | Facility: CLINIC | Age: 38
End: 2018-06-07
Payer: MEDICAID

## 2018-06-07 ENCOUNTER — HOSPITAL ENCOUNTER (OUTPATIENT)
Dept: RADIOLOGY | Facility: HOSPITAL | Age: 38
Discharge: HOME | End: 2018-06-07
Attending: UROLOGY
Payer: MEDICAID

## 2018-06-07 VITALS
SYSTOLIC BLOOD PRESSURE: 126 MMHG | BODY MASS INDEX: 34.42 KG/M2 | HEART RATE: 86 BPM | HEIGHT: 70 IN | WEIGHT: 240.44 LBS | DIASTOLIC BLOOD PRESSURE: 72 MMHG

## 2018-06-07 DIAGNOSIS — C64.2 RENAL CELL CARCINOMA, LEFT: Primary | ICD-10-CM

## 2018-06-07 DIAGNOSIS — C64.9 CANCER OF KIDNEY, UNSPECIFIED LATERALITY: ICD-10-CM

## 2018-06-07 DIAGNOSIS — Z80.51 FAMILY HISTORY OF KIDNEY CANCER: ICD-10-CM

## 2018-06-07 LAB
BILIRUB SERPL-MCNC: ABNORMAL MG/DL
BLOOD URINE, POC: ABNORMAL
COLOR, POC UA: ABNORMAL
GLUCOSE UR QL STRIP: ABNORMAL
KETONES UR QL STRIP: ABNORMAL
LEUKOCYTE ESTERASE URINE, POC: ABNORMAL
NITRITE, POC UA: ABNORMAL
PH, POC UA: 6
PROTEIN, POC: ABNORMAL
SPECIFIC GRAVITY, POC UA: 1.02
UROBILINOGEN, POC UA: ABNORMAL

## 2018-06-07 PROCEDURE — 71046 X-RAY EXAM CHEST 2 VIEWS: CPT | Mod: 26,,, | Performed by: RADIOLOGY

## 2018-06-07 PROCEDURE — 76700 US EXAM ABDOM COMPLETE: CPT | Mod: 26,,, | Performed by: RADIOLOGY

## 2018-06-07 PROCEDURE — 99214 OFFICE O/P EST MOD 30 MIN: CPT | Mod: 25,S$GLB,, | Performed by: UROLOGY

## 2018-06-07 PROCEDURE — 81002 URINALYSIS NONAUTO W/O SCOPE: CPT | Mod: S$GLB,,, | Performed by: UROLOGY

## 2018-06-07 PROCEDURE — 76700 US EXAM ABDOM COMPLETE: CPT | Mod: TC

## 2018-06-07 PROCEDURE — 71046 X-RAY EXAM CHEST 2 VIEWS: CPT | Mod: TC,FY

## 2018-06-07 NOTE — PROGRESS NOTES
"Subjective:      Wilver Lara is a 37 y.o. male who returns today regarding his     Renal cell carcinoma status post nephrectomy approximately 3 years ago.  He's doing well and has no complaints.  No symptoms of local recurrence and no symptoms of metastatic disease.  He is seeing a general surgeon about his gallbladder polyp and has elected observation.    Interestingly his mother just underwent nephrectomy at [facility] by Dr Alfredo Gibbons also for renal cell carcinoma.  There is no known family history of von Hippel-Lindau syndrome nor tuberous sclerosis.  No other family members have renal cell carcinoma.  He's not sure of the type of renal cell carcinoma his mother had.    The following portions of the patient's history were reviewed and updated as appropriate: allergies, current medications, past family history, past medical history, past social history, past surgical history and problem list.    Review of Systems  Pertinent items are noted in HPI.  A comprehensive multipoint review of systems was negative except as otherwise stated in the HPI.     Objective:   Vitals: /72 (BP Location: Right arm, Patient Position: Sitting, BP Method: Medium (Automatic))   Pulse 86   Ht 5' 10" (1.778 m)   Wt 109.1 kg (240 lb 6.6 oz)   BMI 34.50 kg/m²     Physical Exam   General: alert and oriented, no acute distress  Respiratory: Symmetric expansion, non-labored breathing  Cardiovascular: no peripheral edema  Abdomen: non distended   Skin: normal coloration and turgor, no rashes, no suspicious skin lesions noted  Neuro: no gross deficits  Psych: normal judgment and insight, normal mood/affect and non-anxious    Physical Exam    Lab Review   Urinalysis demonstrates dipstick shows trace blood but microscopic urinalysis negative    Liver function test normal glucose mildly elevated  Discussed the importance of diet and weight loss and monitoring his glucose and avoiding diabetes with a solitary kidney  Also " discussed the importance of seeing his primary physician    Lab Results   Component Value Date    WBC 8.01 06/06/2018    HGB 14.6 06/06/2018    HCT 42.1 06/06/2018    MCV 85 06/06/2018     06/06/2018     Lab Results   Component Value Date    CREATININE 1.4 06/06/2018    BUN 15 06/06/2018       Imaging  Chest x-ray no metastases    Abdominal ultrasound stable gallbladder polyp otherwise negative    Assessment and Plan:   Renal cell carcinoma, left  grade I clear cell [id number] sp left nephrectomy YANA  -     POCT URINE DIPSTICK WITHOUT MICROSCOPE  -     Comprehensive metabolic panel; Future; Expected date: 06/07/2018  -     CBC auto differential; Future; Expected date: 06/07/2018  -     US Abdomen Complete; Future; Expected date: 06/07/2018  -     X-Ray Chest PA And Lateral; Future; Expected date: 06/07/2018    Family history of kidney cancer  We discussed the signs and symptoms of von Hippel-Lindau syndrome and tuberous sclerosis.  It doesn't sound like he or his family members have any of these.  He will give me a copy of his mother's pathology report to review.  Discussed referral to genetic counselor or [facility].

## 2018-06-22 ENCOUNTER — TELEPHONE (OUTPATIENT)
Dept: UROLOGY | Facility: CLINIC | Age: 38
End: 2018-06-22

## 2018-06-22 NOTE — TELEPHONE ENCOUNTER
LM for patient's wife stating Dr Mariscal has not reviewed report.  Will make sure he reviews it on Monday 6/25

## 2018-06-22 NOTE — TELEPHONE ENCOUNTER
----- Message from Paige Howard sent at 6/22/2018  9:58 AM CDT -----  Contact: Jessa Lara            Name of Who is Calling: Jessa Lara      What is the request in detail: Pt wife is calling to find out if Dr. Mariscal reviewed pt pathology report that was dropped off three weeks ago.  Please contact pt wife to further discuss and advise.      Can the clinic reply by [facility]: No      What Number to Call Back if not in [facility]: [phone]

## 2018-06-28 ENCOUNTER — TELEPHONE (OUTPATIENT)
Dept: UROLOGY | Facility: CLINIC | Age: 38
End: 2018-06-28

## 2018-06-28 NOTE — TELEPHONE ENCOUNTER
I reviewed his mother's pathology which shows clear cell RCC.  I left a message that I would contact them next week once I have more information.  I suspect both tumors are sporadic but I sent a message to Dr Montana to see what genetic testing resources we have available internally at [facility] to look for VHL, tuberous sclerosis etc.

## 2018-06-28 NOTE — TELEPHONE ENCOUNTER
----- Message from Idania Martin sent at 6/28/2018 11:44 AM CDT -----  Name of Who is Calling: Betsy (Wife)      What is the request in detail: Pt is checking on the status of the pts pathology reports. Jacklyn advised that the  Would review them by 6/25        Can the clinic reply by [facility]:    No       What Number to Call Back if not in [facility]:[phone]

## 2018-07-02 ENCOUNTER — TELEPHONE (OUTPATIENT)
Dept: UROLOGY | Facility: CLINIC | Age: 38
End: 2018-07-02

## 2018-07-02 NOTE — TELEPHONE ENCOUNTER
----- Message from Arron Izquierdo MD sent at 6/29/2018  8:05 AM CDT -----  We have no adult geneticists at [facility], but we have an excellent service called [facility], which provides telehealth geneticists and genetic counselors.  Sounds hokey, but is actually very, very good.  There is just a form to fill out to get the patient an appt.  The other alternative is to refer to the [facility].  Thanks!  -Arron    ----- Message -----  From: Sunil Montana MD  Sent: 6/29/2018  12:25 AM  To: MD Rodrigo Reyna,    Please see below.  Any suggestions?  Thanks    aLndon  ----- Message -----  From: Kole Mariscal MD  Sent: 6/28/2018   3:34 PM  To: MD Landon Wild,    This patient had a clear cell RCC when he was about 35 year old.  His mother also had a clear cell RCC.  I suspect both tumors are sporadic, but the family is asking about genetic testing.  Do you know if we have a physician at [facility] that will do VHL and tuberous sclerosis testing and counseling?    Thanks    Kole

## 2018-07-03 DIAGNOSIS — C64.9 RENAL CELL CARCINOMA, UNSPECIFIED LATERALITY: Primary | ICD-10-CM

## 2018-07-03 DIAGNOSIS — Z80.51 FAMILY HISTORY OF RENAL CELL CARCINOMA: ICD-10-CM

## 2018-09-24 ENCOUNTER — HOSPITAL ENCOUNTER (EMERGENCY)
Facility: HOSPITAL | Age: 38
Discharge: HOME | End: 2018-09-24
Attending: INTERNAL MEDICINE
Payer: MEDICAID

## 2018-09-24 VITALS
WEIGHT: 245 LBS | TEMPERATURE: 98 F | BODY MASS INDEX: 35.07 KG/M2 | RESPIRATION RATE: 17 BRPM | DIASTOLIC BLOOD PRESSURE: 97 MMHG | HEIGHT: 70 IN | OXYGEN SATURATION: 98 % | HEART RATE: 72 BPM | SYSTOLIC BLOOD PRESSURE: 129 MMHG

## 2018-09-24 DIAGNOSIS — S60.454A FOREIGN BODY OF RIGHT RING FINGER: Primary | ICD-10-CM

## 2018-09-24 PROCEDURE — 25000003 PHARM REV CODE 250: Performed by: INTERNAL MEDICINE

## 2018-09-24 PROCEDURE — 20520 RMVL FB MUSC/TDN SIMPLE: CPT

## 2018-09-24 PROCEDURE — 99283 EMERGENCY DEPT VISIT LOW MDM: CPT | Mod: 25

## 2018-09-24 RX ORDER — CEPHALEXIN 500 MG/1
500 CAPSULE ORAL EVERY 12 HOURS
Qty: 20 CAPSULE | Refills: 0 | Status: SHIPPED | OUTPATIENT
Start: 2018-09-24 | End: 2018-10-04

## 2018-09-24 RX ORDER — LIDOCAINE HYDROCHLORIDE 10 MG/ML
10 INJECTION INFILTRATION; PERINEURAL
Status: DISCONTINUED | OUTPATIENT
Start: 2018-09-24 | End: 2018-09-24

## 2018-09-24 RX ADMIN — BACITRACIN ZINC NEOMYCIN SULFATE POLYMYXIN B SULFATE: 400; 3.5; 5 OINTMENT TOPICAL at 08:09

## 2018-09-25 NOTE — ED PROVIDER NOTES
Encounter Date: 9/24/2018    SCRIBE #1 NOTE: I, Kathryn Guerrero, am scribing for, and in the presence of,  Dr. Maher. I have scribed the following portions of the note - Other sections scribed: HPI, ROS, PE.       History     Chief Complaint   Patient presents with    fish hook in finger     Fish hook stook in the ring finger of right hand.      38 year old male presents to the ED complaining of a fish hook in his right ring finger since today. Denies numbness. He attempted to get the hook out himself and clipped some of it off already, but unable to get entire hook out. Tetanus immunization is UTD.        The history is provided by the patient.     Review of patient's allergies indicates:  No Known Allergies  Past Medical History:   Diagnosis Date    Allergy     Cancer     left kidney mass    Cancer of kidney      Past Surgical History:   Procedure Laterality Date    ANKLE SURGERY Left     bone spurs    NEPHRECTOMY      NEPHRECTOMY-RADICAL Left 6/3/2015    [facility]    rt knee meniscus       Family History   Problem Relation Age of Onset    Lung cancer Father     Lung cancer Paternal Grandfather     Kidney cancer Mother      Social History     Tobacco Use    Smoking status: Never Smoker    Smokeless tobacco: Never Used   Substance Use Topics    Alcohol use: No    Drug use: No     Review of Systems   Constitutional: Negative for chills and fever.   Skin:        Fish hook in right ring finger   Neurological: Negative for numbness.   All other systems reviewed and are negative.      Physical Exam     Initial Vitals [09/24/18 1852]   BP Pulse Resp Temp SpO2   (!) 129/97 72 17 98.3 °F (36.8 °C) 98 %      MAP       --         Physical Exam    Nursing note and vitals reviewed.  Constitutional: He appears well-developed and well-nourished.   HENT:   Head: Normocephalic and atraumatic.   Right Ear: External ear normal.   Left Ear: External ear normal.   Nose: Nose normal.    Eyes: Conjunctivae are normal.   Neck: Normal range of motion. Neck supple.   Cardiovascular: Normal rate and intact distal pulses.   Pulmonary/Chest: Effort normal. No respiratory distress.   Abdominal: Soft. There is no tenderness.   Musculoskeletal: Normal range of motion.        Right hand: He exhibits normal capillary refill and no swelling. Normal sensation noted.        Hands:  Neurological: He is alert and oriented to person, place, and time.   Skin: Skin is warm and dry. Capillary refill takes less than 2 seconds.   Psychiatric: He has a normal mood and affect. His behavior is normal.         ED Course   Foreign Body  Date/Time: 9/24/2018 8:34 PM  Performed by: Gal Maher MD  Authorized by: Gal Maher MD   Consent Done: Yes  Consent: Verbal consent obtained.  Risks and benefits: risks, benefits and alternatives were discussed  Consent given by: patient  Patient understanding: patient states understanding of the procedure being performed  Patient consent: the patient's understanding of the procedure matches consent given  Procedure consent: procedure consent matches procedure scheduled  Body area: skin  General location: upper extremity  Location details: right ring finger  Anesthesia: local infiltration    Anesthesia:  Local Anesthetic: lidocaine 1% with epinephrine  Anesthetic total: 3 mL  Patient sedated: no  Patient restrained: no  Patient cooperative: yes  Localization method: visualized  Removal mechanism: forceps  Dressing: antibiotic ointment and dressing applied  Tendon involvement: none  Depth: subcutaneous  Complexity: simple  1 objects recovered.  Objects recovered: partial fish hook  Post-procedure assessment: foreign body removed  Patient tolerance: Patient tolerated the procedure well with no immediate complications      Labs Reviewed - No data to display       Imaging Results          X-Ray Finger 2 or More Views Right (Final result)  Result time 09/24/18 19:14:25    Final  result by Raheel Tapia MD (09/24/18 19:14:25)                 Impression:      Metallic fishhook within the volar distal 4th digit soft tissues.  No acute fracture.      Electronically signed by: Raheel Tapia MD  Date:    09/24/2018  Time:    19:14             Narrative:    EXAMINATION:  XR FINGER 2 OR MORE VIEWS RIGHT    CLINICAL HISTORY:  Fish hook to right ring finger;    TECHNIQUE:  Two views of the right 4th digit.    COMPARISON:  None    FINDINGS:  No evidence of acute fracture or dislocation.  There is a linear radiopaque fishhook identified in the volar aspect of the 4th digit overlying the distal phalanx.  Mild soft tissue swelling.                                 Medical Decision Making:   Initial Assessment:   38 year old male presents to the ED complaining of a fish hook in his right ring finger since today. Denies numbness. He attempted to get the hook out himself and clipped some of it off already, but unable to get entire hook out.            Scribe Attestation:   Scribe #1: I performed the above scribed service and the documentation accurately describes the services I performed. I attest to the accuracy of the note.    This document was produced by a scribe under my direction and in my presence. I agree with the content of the note and have made any necessary edits.     Dr. Maher    09/24/2018 8:38 PM             Clinical Impression:     1. Foreign body of right ring finger            Disposition:   Disposition: Discharged  Condition: Stable                        Gal Maher MD  09/24/18 2038

## 2019-05-16 ENCOUNTER — OFFICE VISIT (OUTPATIENT)
Dept: URGENT CARE | Facility: CLINIC | Age: 39
End: 2019-05-16
Payer: MEDICAID

## 2019-05-16 VITALS
OXYGEN SATURATION: 98 % | BODY MASS INDEX: 35.07 KG/M2 | TEMPERATURE: 98 F | SYSTOLIC BLOOD PRESSURE: 120 MMHG | DIASTOLIC BLOOD PRESSURE: 77 MMHG | WEIGHT: 245 LBS | HEART RATE: 79 BPM | HEIGHT: 70 IN | RESPIRATION RATE: 18 BRPM

## 2019-05-16 DIAGNOSIS — L40.9 PSORIASIS: Primary | ICD-10-CM

## 2019-05-16 PROCEDURE — 99214 OFFICE O/P EST MOD 30 MIN: CPT | Mod: S$GLB,,, | Performed by: PHYSICIAN ASSISTANT

## 2019-05-16 PROCEDURE — 99214 PR OFFICE/OUTPT VISIT, EST, LEVL IV, 30-39 MIN: ICD-10-PCS | Mod: S$GLB,,, | Performed by: PHYSICIAN ASSISTANT

## 2019-05-16 RX ORDER — CLOBETASOL PROPIONATE 0.5 MG/G
CREAM TOPICAL 2 TIMES DAILY
Qty: 60 G | Refills: 0 | Status: SHIPPED | OUTPATIENT
Start: 2019-05-16 | End: 2019-06-15

## 2019-05-16 RX ORDER — HYDROCORTISONE 25 MG/G
CREAM TOPICAL 2 TIMES DAILY
Qty: 28 G | Refills: 1 | Status: SHIPPED | OUTPATIENT
Start: 2019-05-16 | End: 2019-05-30

## 2019-05-16 NOTE — PROGRESS NOTES
"Subjective:       Patient ID: Wilver Lara is a 38 y.o. male.    Vitals:  height is 5' 10" (1.778 m) and weight is 111.1 kg (245 lb). His temperature is 97.5 °F (36.4 °C). His blood pressure is 120/77 and his pulse is 79. His respiration is 18 and oxygen saturation is 98%.     Chief Complaint: Psoriasis    Patient states that he has been having psoriasis since he was a child.  He has ran out of his steroid creams and has not had them in a while.  He states that his psoriasis has been worse lately and also is involving the intertriginous areas of the groin.  He states that is affecting his day-to-day life and his work, so he would like a refill on his creams.  He has a dermatologist, but he cannot be seen for another 45 days.    Psoriasis   This is a chronic problem. Associated symptoms include a rash. Pertinent negatives include no arthralgias, chills, coughing, fever, joint swelling or sore throat. The treatment provided no relief.       Constitution: Negative for chills and fever.   HENT: Negative for facial swelling and sore throat.    Neck: Negative for painful lymph nodes.   Eyes: Negative for eye itching and eyelid swelling.   Respiratory: Negative for cough.    Musculoskeletal: Negative for joint pain and joint swelling.   Skin: Positive for rash. Negative for color change, pale, wound, abrasion, laceration, lesion, skin thickening/induration, puncture wound, erythema, bruising, abscess, avulsion and hives.   Allergic/Immunologic: Negative for environmental allergies, immunocompromised state and hives.   Hematologic/Lymphatic: Negative for swollen lymph nodes.       Objective:      Physical Exam   Constitutional: He is oriented to person, place, and time. He appears well-developed and well-nourished.   HENT:   Head: Normocephalic and atraumatic. Head is without abrasion, without contusion and without laceration.   Right Ear: External ear normal.   Left Ear: External ear normal.   Nose: Nose normal. "   Mouth/Throat: Oropharynx is clear and moist.   Eyes: Pupils are equal, round, and reactive to light. Conjunctivae, EOM and lids are normal.   Neck: Trachea normal, full passive range of motion without pain and phonation normal. Neck supple.   Cardiovascular: Normal rate, regular rhythm and normal heart sounds.   Pulmonary/Chest: Effort normal and breath sounds normal. No stridor. No respiratory distress.   Genitourinary:   Genitourinary Comments: Patient declined exam.  He does not want me to look at the rash on his groin, he states that it is exactly like the rest of his psoriasis rash.   Musculoskeletal: Normal range of motion.   Neurological: He is alert and oriented to person, place, and time.   Skin: Skin is warm, dry and intact. Capillary refill takes less than 2 seconds. Rash noted. No abrasion, no bruising, no burn, no ecchymosis, no laceration, no lesion and no petechiae noted. No erythema.        Psychiatric: He has a normal mood and affect. His speech is normal and behavior is normal. Judgment and thought content normal. Cognition and memory are normal.   Nursing note and vitals reviewed.        Instructed patient to use the clobetasol only on extensor surfaces and to use the lower dose hydrocortisone cream for intertriginous area of groin.    Assessment:       1. Psoriasis        Plan:         Psoriasis  -     hydrocortisone 2.5 % cream; Apply topically 2 (two) times daily. for 14 days  Dispense: 28 g; Refill: 1  -     clobetasol (TEMOVATE) 0.05 % cream; Apply topically 2 (two) times daily.  Dispense: 60 g; Refill: 0      Patient Instructions     - Rest.    - Drink plenty of fluids.    - Tylenol or Ibuprofen as directed as needed for fever/pain. Avoid tylenol if you have a history of liver disease. Do not take ibuprofen if you have a history of GI bleeding, kidney disease, or if you take blood thinners.     - clobetasol cream-for psoriasis.  This is a high dosed steroid cream.  Do not use on face,  palms of hands, soles of feet, or groin area.    - hydrocortisone 2.5% cream- this is a lower dose steroid cream that is safe to use in the groin area.    - Follow up with your PCP or specialty clinic as directed in the next 1-2 weeks if not improved or as needed.  You can call [phone] to schedule an appointment with the appropriate provider.    - Go to the ER or seek medical attention immediately if you develop new or worsening symptoms.    - You must understand that you have received an Urgent Care treatment only and that you may be released before all of your medical problems are known or treated.   - You, the patient, will arrange for follow up care as instructed.   - If your condition worsens or fails to improve we recommend that you receive another evaluation at the ER immediately or contact your PCP to discuss your concerns or return here.         Managing Psoriasis     Take baths in warm water to help soften scales.   The success of your medical treatment depends on you. When your healthcare provider gives you a treatment plan, ask when you should expect to see results. Then, follow your plan. If your treatment does not work in the expected time, let your healthcare provider know. Psoriasis is a common disease, and it can respond to many different treatments. It depends on the location, size, and symptoms each person experiences. Some treatments are simple (tar-based therapies or topical steroids). Other treatments are complex (new biologic medicines or light therapy). Your healthcare provider will need to personalize your treatment. Psoriasis will often get better with treatment. But it can get worse later if you stop treatment or if a new illness occurs. In most cases, you can get control of your psoriasis again. You will likely need to see your healthcare provider regularly about treatment options.  Psoriasis self-care  Follow these steps to help manage your symptoms:  · Take baths to help soften  scales. Use warm water, not hot water. To avoid drying out your skin, limit each bath to about 15 minutes. Add bath oil or [place] salts.  · After you bathe, apply lotion right away, while your skin is damp. Dry skin can make symptoms worse.  · Use a scalp treatment as prescribed by your healthcare provider. There are different solutions and dosages based on your symptoms.   · Seek treatment right away for any illnesses or skin injuries because they can cause flare-ups.  · Manage your stress, and use relaxation techniques.  · Expose your psoriatic skin to sunlight for 5 minutes a day. But dont do this if you feel that sun exposure makes your psoriasis worse. Use sunscreen on the normal, unaffected skin, but avoid sunburns.  · Use over-the-counter hydrocortisone cream for itching to reduce scaling for active outbreaks. Ask your healthcare provider about long-term use.  · Stick with treatment that your healthcare provider has recommended for you, especially if it's controlling your psoriasis.  · Avoid abrasive cleansers, harsh detergents, and household chemicals.  Getting good results  Now that you know more about psoriasis, the next step is up to you. Follow your healthcare provider's treatment plan and self-care routine. Doing so can help you control your symptoms. If your symptoms dont improve or they get worse, call your healthcare provider. Psoriasis cant be cured. But its symptoms can be managed.   Date Last Reviewed: 2/1/2017  © 3292-0845 [employer]. [address] [place] [address], [place] [address]. All rights reserved. This information is not intended as a substitute for professional medical care. Always follow your healthcare professional's instructions.

## 2019-05-16 NOTE — PATIENT INSTRUCTIONS
- Rest.    - Drink plenty of fluids.    - Tylenol or Ibuprofen as directed as needed for fever/pain. Avoid tylenol if you have a history of liver disease. Do not take ibuprofen if you have a history of GI bleeding, kidney disease, or if you take blood thinners.     - clobetasol cream-for psoriasis.  This is a high dosed steroid cream.  Do not use on face, palms of hands, soles of feet, or groin area.    - hydrocortisone 2.5% cream- this is a lower dose steroid cream that is safe to use in the groin area.    - Follow up with your PCP or specialty clinic as directed in the next 1-2 weeks if not improved or as needed.  You can call [phone] to schedule an appointment with the appropriate provider.    - Go to the ER or seek medical attention immediately if you develop new or worsening symptoms.    - You must understand that you have received an Urgent Care treatment only and that you may be released before all of your medical problems are known or treated.   - You, the patient, will arrange for follow up care as instructed.   - If your condition worsens or fails to improve we recommend that you receive another evaluation at the ER immediately or contact your PCP to discuss your concerns or return here.         Managing Psoriasis     Take baths in warm water to help soften scales.   The success of your medical treatment depends on you. When your healthcare provider gives you a treatment plan, ask when you should expect to see results. Then, follow your plan. If your treatment does not work in the expected time, let your healthcare provider know. Psoriasis is a common disease, and it can respond to many different treatments. It depends on the location, size, and symptoms each person experiences. Some treatments are simple (tar-based therapies or topical steroids). Other treatments are complex (new biologic medicines or light therapy). Your healthcare provider will need to personalize your treatment. Psoriasis will  often get better with treatment. But it can get worse later if you stop treatment or if a new illness occurs. In most cases, you can get control of your psoriasis again. You will likely need to see your healthcare provider regularly about treatment options.  Psoriasis self-care  Follow these steps to help manage your symptoms:  · Take baths to help soften scales. Use warm water, not hot water. To avoid drying out your skin, limit each bath to about 15 minutes. Add bath oil or [place] salts.  · After you bathe, apply lotion right away, while your skin is damp. Dry skin can make symptoms worse.  · Use a scalp treatment as prescribed by your healthcare provider. There are different solutions and dosages based on your symptoms.   · Seek treatment right away for any illnesses or skin injuries because they can cause flare-ups.  · Manage your stress, and use relaxation techniques.  · Expose your psoriatic skin to sunlight for 5 minutes a day. But dont do this if you feel that sun exposure makes your psoriasis worse. Use sunscreen on the normal, unaffected skin, but avoid sunburns.  · Use over-the-counter hydrocortisone cream for itching to reduce scaling for active outbreaks. Ask your healthcare provider about long-term use.  · Stick with treatment that your healthcare provider has recommended for you, especially if it's controlling your psoriasis.  · Avoid abrasive cleansers, harsh detergents, and household chemicals.  Getting good results  Now that you know more about psoriasis, the next step is up to you. Follow your healthcare provider's treatment plan and self-care routine. Doing so can help you control your symptoms. If your symptoms dont improve or they get worse, call your healthcare provider. Psoriasis cant be cured. But its symptoms can be managed.   Date Last Reviewed: 2/1/2017  © 0304-9234 [employer]. [address] [place] [address], [place] [address]. All rights reserved. This information is not  intended as a substitute for professional medical care. Always follow your healthcare professional's instructions.

## 2019-08-22 ENCOUNTER — TELEPHONE (OUTPATIENT)
Dept: UROLOGY | Facility: CLINIC | Age: 39
End: 2019-08-22

## 2019-08-22 NOTE — TELEPHONE ENCOUNTER
----- Message from Vicky Costa sent at 8/22/2019 10:20 AM CDT -----  Contact: Betsy Lara  Name of Who is Calling: Betsy Lara patients wife      What is the request in detail: Patient is requesting a call back. She states that she will need a copy of his pathology report. She would like to know when she will be able to pick it up.       Can the clinic reply by [facility]: No      What Number to Call Back if not in [facility]:  [phone]

## 2019-09-23 ENCOUNTER — OFFICE VISIT (OUTPATIENT)
Dept: URGENT CARE | Facility: CLINIC | Age: 39
End: 2019-09-23
Payer: MEDICAID

## 2019-09-23 VITALS
HEIGHT: 70 IN | SYSTOLIC BLOOD PRESSURE: 131 MMHG | RESPIRATION RATE: 17 BRPM | TEMPERATURE: 98 F | WEIGHT: 245 LBS | HEART RATE: 73 BPM | DIASTOLIC BLOOD PRESSURE: 85 MMHG | BODY MASS INDEX: 35.07 KG/M2 | OXYGEN SATURATION: 98 %

## 2019-09-23 DIAGNOSIS — Z90.5 SOLITARY KIDNEY, ACQUIRED: ICD-10-CM

## 2019-09-23 DIAGNOSIS — I10 ELEVATED BLOOD PRESSURE READING WITH DIAGNOSIS OF HYPERTENSION: Primary | ICD-10-CM

## 2019-09-23 PROCEDURE — 99213 OFFICE O/P EST LOW 20 MIN: CPT | Mod: S$GLB,,, | Performed by: NURSE PRACTITIONER

## 2019-09-23 PROCEDURE — 99213 PR OFFICE/OUTPT VISIT, EST, LEVL III, 20-29 MIN: ICD-10-PCS | Mod: S$GLB,,, | Performed by: NURSE PRACTITIONER

## 2019-09-23 RX ORDER — LISINOPRIL 2.5 MG/1
2.5 TABLET ORAL DAILY
Qty: 30 TABLET | Refills: 0 | Status: SHIPPED | OUTPATIENT
Start: 2019-09-23 | End: 2019-10-23

## 2019-09-23 NOTE — PROGRESS NOTES
"Subjective:       Patient ID: Wilver Lara is a 39 y.o. male.    Vitals:  height is 5' 10" (1.778 m) and weight is 111.1 kg (245 lb). His temperature is 98 °F (36.7 °C). His blood pressure is 131/85 and his pulse is 73. His respiration is 17 and oxygen saturation is 98%.     Chief Complaint: Hypertension    40 y/o male presented with c/o having frequent episodes of hypertension. Patient states he was previously on blood pressure medication but have been off for a while.  Have not been able to get in with PCP. Would like a new provider. Denies symptoms at present.     Hypertension   This is a new problem. Episode onset: 3 days  The problem has been gradually worsening since onset. Associated symptoms include blurred vision and headaches. Pertinent negatives include no chest pain or shortness of breath. There are no known risk factors for coronary artery disease. Past treatments include nothing. The current treatment provides no improvement. There are no compliance problems.        Constitution: Negative for chills, fatigue and fever.   HENT: Negative for congestion and sore throat.    Neck: Negative for painful lymph nodes.   Cardiovascular: Negative for chest pain and leg swelling.   Eyes: Positive for blurred vision. Negative for double vision.   Respiratory: Negative for cough and shortness of breath.    Gastrointestinal: Negative for nausea, vomiting and diarrhea.   Genitourinary: Negative for dysuria, frequency and urgency.   Musculoskeletal: Negative for joint pain, joint swelling, muscle cramps and muscle ache.   Skin: Negative for color change, pale and rash.   Allergic/Immunologic: Negative for seasonal allergies.   Neurological: Positive for light-headedness, loss of balance and headaches. Negative for dizziness, history of vertigo and passing out.   Hematologic/Lymphatic: Negative for swollen lymph nodes, easy bruising/bleeding and history of blood clots. Does not bruise/bleed easily. "   Psychiatric/Behavioral: Negative for nervous/anxious, sleep disturbance and depression. The patient is not nervous/anxious.        Objective:      Physical Exam   Constitutional: He is oriented to person, place, and time. He appears well-developed and well-nourished. He is cooperative.  Non-toxic appearance. He does not appear ill. No distress.   HENT:   Head: Normocephalic and atraumatic.   Right Ear: Hearing, tympanic membrane, external ear and ear canal normal.   Left Ear: Hearing, tympanic membrane, external ear and ear canal normal.   Nose: Nose normal. No mucosal edema, rhinorrhea or nasal deformity. No epistaxis. Right sinus exhibits no maxillary sinus tenderness and no frontal sinus tenderness. Left sinus exhibits no maxillary sinus tenderness and no frontal sinus tenderness.   Mouth/Throat: Uvula is midline, oropharynx is clear and moist and mucous membranes are normal. No trismus in the jaw. Normal dentition. No uvula swelling. No posterior oropharyngeal erythema.   Eyes: Conjunctivae and lids are normal. Right eye exhibits no discharge. Left eye exhibits no discharge. No scleral icterus.   Sclera clear bilat   Neck: Trachea normal, normal range of motion, full passive range of motion without pain and phonation normal. Neck supple.   Cardiovascular: Normal rate, regular rhythm, normal heart sounds, intact distal pulses and normal pulses.   Pulmonary/Chest: Effort normal and breath sounds normal. No respiratory distress.   Abdominal: Soft. Normal appearance and bowel sounds are normal. He exhibits no distension, no pulsatile midline mass and no mass. There is no tenderness.   Musculoskeletal: Normal range of motion. He exhibits no edema or deformity.   Neurological: He is alert and oriented to person, place, and time. He exhibits normal muscle tone. Coordination normal.   Skin: Skin is warm, dry and intact. He is not diaphoretic. No pallor.   Psychiatric: He has a normal mood and affect. His speech is  normal and behavior is normal. Judgment and thought content normal. Cognition and memory are normal.   Nursing note and vitals reviewed.      Assessment:       1. Elevated blood pressure reading with diagnosis of hypertension    2. Solitary kidney, acquired        Plan:         Elevated blood pressure reading with diagnosis of hypertension  -     Ambulatory referral to Internal Medicine  -     lisinopril (PRINIVIL,ZESTRIL) 2.5 MG tablet; Take 1 tablet (2.5 mg total) by mouth once daily.  Dispense: 30 tablet; Refill: 0    Solitary kidney, acquired  -     Ambulatory referral to Internal Medicine  -     lisinopril (PRINIVIL,ZESTRIL) 2.5 MG tablet; Take 1 tablet (2.5 mg total) by mouth once daily.  Dispense: 30 tablet; Refill: 0        Elevated Blood Pressure  Your blood pressure was elevated during your visit to the urgent care.  It was not so high that immediate care was needed but it is recommended that you monitor your blood pressure over the next week or two to make sure that it is not staying elevated.  Please have your blood pressure taken 2-3 times daily at different times of the day.  Write all of those blood pressures down and record the time that they were taken.  Keep all that information and take it with you to see your Primary Care Physician.  If your blood pressure is consistently above 140/90 you will need to follow up with your PCP more quickly      Please follow up with your Primary care provider within 2-5 days if your signs and symptoms have not resolved or worsen.     If your condition worsens or fails to improve we recommend that you receive another evaluation at the emergency room immediately or contact your primary medical clinic to discuss your concerns.   You must understand that you have received an Urgent Care treatment only and that you may be released before all of your medical problems are known or treated. You, the patient, will arrange for follow up care as instructed.       RED  "FLAGS/WARNING SYMPTOMS DISCUSSED WITH PATIENT THAT WOULD WARRANT EMERGENT MEDICAL ATTENTION. PATIENT VERBALIZED UNDERSTANDING.         Discharge Instructions for High Blood Pressure (Hypertension)  You have been diagnosed with high blood pressure (also called hypertension). This means the force of blood against your artery walls is too strong. It also means your heart is working hard to move blood. High blood pressure usually has no symptoms, but over time, it can damage your heart, blood vessels, eyes, kidneys, and other organs. With help from your doctor, you can manage your blood pressure and protect your health.  Taking medicine  · Learn to take your own blood pressure. Keep a record of your results. Ask your doctor which readings mean that you need medical attention.  · Take your blood pressure medicine exactly as directed. Dont skip doses. Missing doses can cause your blood pressure to get out of control.  · If you do miss a dose (or doses) check with your healthcare provider about what to do.  · Avoid medicine that contain heart stimulants, including over-the-counter drugs. Check for warnings about high blood pressure on the label. Ask the pharmacist before purchasing something you haven't used before  · Check with your doctor or pharmacist before taking a decongestant. Some decongestants can worsen high blood pressure.  Lifestyle changes  · Maintain a healthy weight. Get help to lose any extra pounds.  · Cut back on salt.  ¨ Limit canned, dried, packaged, and fast foods.  ¨ Dont add salt to your food at the table.  ¨ Season foods with herbs instead of salt when you cook.  ¨ Request no added salt when you go to a restaurant.  ¨ The American Heart Associations (AHA) "ideal" sodium intake recommendation is 1,500 milligrams per day.  However, since American's eat so much salt, the AHA says a positive change can occur by cutting back to even 2,400 milligrams of sodium a day.   · Follow the DASH (Dietary " Approaches to Stop Hypertension) eating plan. This plan recommends vegetables, fruits, whole gains, and other heart healthy foods.  · Begin an exercise program. Ask your doctor how to get started. The American Heart Association recommends aerobic exercise 3 to 4 times a week for an average of 40 minutes at a time, with your doctor's approval. Simple activities like walking or gardening can help.  · Break the smoking habit. Enroll in a stop-smoking program to improve your chances of success. Ask your healthcare provider about programs and medicines to help you stop smoking.  · Limit drinks that contain caffeine (coffee, black or green tea, cola) to 2 per day.  · Never take stimulants such as amphetamines or cocaine; these drugs can be deadly for someone with high blood pressure.  · Control your stress. Learn stress-management techniques.  · Limit alcohol to no more than 1 drink a day for women and 2 drinks a day for men.  Follow-up care  Make a follow-up appointment as directed by our staff.     When to seek medical care  Call your doctor immediately or seek emergency care if you have any of the following:  · Chest pain or shortness of breath (call 911)  · Moderate to severe headache  · Weakness in the muscles of your face, arms, or legs  · Trouble speaking  · Extreme drowsiness  · Confusion  · Fainting or dizziness  · Pulsating or rushing sound in your ears  · Unexplained nosebleed  · Weakness, tingling, or numbness of your face, arms, or legs  · Change in vision  · Blood pressure measured at home that is greater than 180/110   Date Last Reviewed: 4/27/2016  © 1995-4791 [employer]. [address] [place] [address], [place] [address]. All rights reserved. This information is not intended as a substitute for professional medical care. Always follow your healthcare professional's instructions.

## 2019-09-23 NOTE — PATIENT INSTRUCTIONS
Elevated Blood Pressure  Your blood pressure was elevated during your visit to the urgent care.  It was not so high that immediate care was needed but it is recommended that you monitor your blood pressure over the next week or two to make sure that it is not staying elevated.  Please have your blood pressure taken 2-3 times daily at different times of the day.  Write all of those blood pressures down and record the time that they were taken.  Keep all that information and take it with you to see your Primary Care Physician.  If your blood pressure is consistently above 140/90 you will need to follow up with your PCP more quickly      Please follow up with your Primary care provider within 2-5 days if your signs and symptoms have not resolved or worsen.     If your condition worsens or fails to improve we recommend that you receive another evaluation at the emergency room immediately or contact your primary medical clinic to discuss your concerns.   You must understand that you have received an Urgent Care treatment only and that you may be released before all of your medical problems are known or treated. You, the patient, will arrange for follow up care as instructed.       RED FLAGS/WARNING SYMPTOMS DISCUSSED WITH PATIENT THAT WOULD WARRANT EMERGENT MEDICAL ATTENTION. PATIENT VERBALIZED UNDERSTANDING.         Discharge Instructions for High Blood Pressure (Hypertension)  You have been diagnosed with high blood pressure (also called hypertension). This means the force of blood against your artery walls is too strong. It also means your heart is working hard to move blood. High blood pressure usually has no symptoms, but over time, it can damage your heart, blood vessels, eyes, kidneys, and other organs. With help from your doctor, you can manage your blood pressure and protect your health.  Taking medicine  · Learn to take your own blood pressure. Keep a record of your results. Ask your doctor which readings mean  "that you need medical attention.  · Take your blood pressure medicine exactly as directed. Dont skip doses. Missing doses can cause your blood pressure to get out of control.  · If you do miss a dose (or doses) check with your healthcare provider about what to do.  · Avoid medicine that contain heart stimulants, including over-the-counter drugs. Check for warnings about high blood pressure on the label. Ask the pharmacist before purchasing something you haven't used before  · Check with your doctor or pharmacist before taking a decongestant. Some decongestants can worsen high blood pressure.  Lifestyle changes  · Maintain a healthy weight. Get help to lose any extra pounds.  · Cut back on salt.  ¨ Limit canned, dried, packaged, and fast foods.  ¨ Dont add salt to your food at the table.  ¨ Season foods with herbs instead of salt when you cook.  ¨ Request no added salt when you go to a restaurant.  ¨ The American Heart Associations (AHA) "ideal" sodium intake recommendation is 1,500 milligrams per day.  However, since American's eat so much salt, the AHA says a positive change can occur by cutting back to even 2,400 milligrams of sodium a day.   · Follow the DASH (Dietary Approaches to Stop Hypertension) eating plan. This plan recommends vegetables, fruits, whole gains, and other heart healthy foods.  · Begin an exercise program. Ask your doctor how to get started. The American Heart Association recommends aerobic exercise 3 to 4 times a week for an average of 40 minutes at a time, with your doctor's approval. Simple activities like walking or gardening can help.  · Break the smoking habit. Enroll in a stop-smoking program to improve your chances of success. Ask your healthcare provider about programs and medicines to help you stop smoking.  · Limit drinks that contain caffeine (coffee, black or green tea, cola) to 2 per day.  · Never take stimulants such as amphetamines or cocaine; these drugs can be deadly for " someone with high blood pressure.  · Control your stress. Learn stress-management techniques.  · Limit alcohol to no more than 1 drink a day for women and 2 drinks a day for men.  Follow-up care  Make a follow-up appointment as directed by our staff.     When to seek medical care  Call your doctor immediately or seek emergency care if you have any of the following:  · Chest pain or shortness of breath (call 911)  · Moderate to severe headache  · Weakness in the muscles of your face, arms, or legs  · Trouble speaking  · Extreme drowsiness  · Confusion  · Fainting or dizziness  · Pulsating or rushing sound in your ears  · Unexplained nosebleed  · Weakness, tingling, or numbness of your face, arms, or legs  · Change in vision  · Blood pressure measured at home that is greater than 180/110   Date Last Reviewed: 4/27/2016  © 2368-2337 [employer]. [address] [place] [address], [place] [address]. All rights reserved. This information is not intended as a substitute for professional medical care. Always follow your healthcare professional's instructions.

## 2019-10-23 ENCOUNTER — TELEPHONE (OUTPATIENT)
Dept: UROLOGY | Facility: CLINIC | Age: 39
End: 2019-10-23

## 2019-10-23 DIAGNOSIS — C64.2 RENAL CELL CARCINOMA OF LEFT KIDNEY: Primary | ICD-10-CM

## 2019-10-23 NOTE — TELEPHONE ENCOUNTER
----- Message from Alecia Baez MA sent at 10/23/2019  7:05 AM CDT -----  Can you please schedule the below items  ----- Message -----  From: Candelaria Woods NP  Sent: 10/23/2019   7:03 AM CDT  To: Alecia Baez MA    Orders are in -abdominal US, CXR, CBC and CMP. Thanks!    ----- Message -----  From: Alecia Baez MA  Sent: 10/22/2019   3:53 PM CDT  To: [facility] Staff  Caller: ERIC MARKS (Today, 11:51 AM)         Name of Who is Calling: ERIC MARKS       What is the request in detail: Patient is requesting orders for blood work,ultrasound and ct scan to be placed in the system       Can the clinic reply by [facility]: no       What Number to Call Back if not in [facility]: [phone]

## 2019-11-08 ENCOUNTER — OFFICE VISIT (OUTPATIENT)
Dept: URGENT CARE | Facility: CLINIC | Age: 39
End: 2019-11-08
Payer: MEDICAID

## 2019-11-08 VITALS
HEIGHT: 70 IN | OXYGEN SATURATION: 98 % | SYSTOLIC BLOOD PRESSURE: 128 MMHG | DIASTOLIC BLOOD PRESSURE: 70 MMHG | WEIGHT: 245 LBS | BODY MASS INDEX: 35.07 KG/M2 | TEMPERATURE: 97 F | HEART RATE: 68 BPM

## 2019-11-08 DIAGNOSIS — J06.9 VIRAL URI WITH COUGH: Primary | ICD-10-CM

## 2019-11-08 PROCEDURE — 99214 OFFICE O/P EST MOD 30 MIN: CPT | Mod: S$GLB,,, | Performed by: PHYSICIAN ASSISTANT

## 2019-11-08 PROCEDURE — 99214 PR OFFICE/OUTPT VISIT, EST, LEVL IV, 30-39 MIN: ICD-10-PCS | Mod: S$GLB,,, | Performed by: PHYSICIAN ASSISTANT

## 2019-11-08 RX ORDER — BENZONATATE 100 MG/1
200 CAPSULE ORAL 3 TIMES DAILY PRN
Qty: 20 CAPSULE | Refills: 0 | Status: SHIPPED | OUTPATIENT
Start: 2019-11-08

## 2019-11-08 NOTE — PATIENT INSTRUCTIONS
Tessalon perles for cough  Please monitor fevers closely, alternate Motrin and Tylenol as needed for fevers  Increase fluids and rest  If unable to tolerate fluids or foods please go to the emergency department  theraflu for symptoms as discussed or can use options listed below  Follow up with primary care in 1 week    Please drink plenty of fluids.  Please get plenty of rest.  Please return here or go to the Emergency Department for any concerns or worsening of condition.  If you were given wait & see antibiotics, please wait 3-5 days before taking them, and only take them if your symptoms have worsened or not improved.  If you do begin taking the antibiotics, please take them to completion.  If you were prescribed antibiotics, please take them to completion.  If you were prescribed a narcotic medication, do not drive or operate heavy equipment or machinery while taking these medications.  If you do not have Hypertension or any history of palpitations, it is ok to take over the counter Sudafed or Mucinex D or Allegra-D or Claritin-D or Zyrtec-D.  If you do take one of the above, it is ok to combine that with plain over the counter Mucinex or Allegra or Claritin or Zyrtec.  If for example you are taking Zyrtec -D, you can combine that with Mucinex, but not Mucinex-D.  If you are taking Mucinex-D, you can combine that with plain Allegra or Claritin or Zyrtec.   If you do have Hypertension or palpitations, it is safe to take Coricidin HBP for relief of sinus symptoms.  We recommend you take over the counter Flonase (Fluticasone) or another nasally inhaled steroid unless you are already taking one.  Nasal irrigation with a saline spray or Netti Pot like device per their directions is also recommended.  If not allergic, please take over the counter Tylenol (Acetaminophen) and/or Motrin (Ibuprofen) as directed for control of pain and/or fever.  Please follow up with your primary care doctor or specialist as needed.    If  you  smoke, please stop smoking.        Viral Upper Respiratory Illness (Adult)  You have a viral upper respiratory illness (URI), which is another term for the common cold. This illness is contagious during the first few days. It is spread through the air by coughing and sneezing. It may also be spread by direct contact (touching the sick person and then touching your own eyes, nose, or mouth). Frequent handwashing will decrease risk of spread. Most viral illnesses go away within 7 to 10 days with rest and simple home remedies. Sometimes the illness may last for several weeks. Antibiotics will not kill a virus, and they are generally not prescribed for this condition.    Home care  · If symptoms are severe, rest at home for the first 2 to 3 days. When you resume activity, don't let yourself get too tired.  · Avoid being exposed to cigarette smoke (yours or others).  · You may use acetaminophen or ibuprofen to control pain and fever, unless another medicine was prescribed. (Note: If you have chronic liver or kidney disease, have ever had a stomach ulcer or gastrointestinal bleeding, or are taking blood-thinning medicines, talk with your healthcare provider before using these medicines.) Aspirin should never be given to anyone under 18 years of age who is ill with a viral infection or fever. It may cause severe liver or brain damage.  · Your appetite may be poor, so a light diet is fine. Avoid dehydration by drinking 6 to 8 glasses of fluids per day (water, soft drinks, juices, tea, or soup). Extra fluids will help loosen secretions in the nose and lungs.  · Over-the-counter cold medicines will not shorten the length of time youre sick, but they may be helpful for the following symptoms: cough, sore throat, and nasal and sinus congestion. (Note: Do not use decongestants if you have high blood pressure.)  Follow-up care  Follow up with your healthcare provider, or as advised.  When to seek medical advice  Call your  healthcare provider right away if any of these occur:  · Cough with lots of colored sputum (mucus)  · Severe headache; face, neck, or ear pain  · Difficulty swallowing due to throat pain  · Fever of 100.4°F (38°C)  Call 911, or get immediate medical care  Call emergency services right away if any of these occur:  · Chest pain, shortness of breath, wheezing, or difficulty breathing  · Coughing up blood  · Inability to swallow due to throat pain  Date Last Reviewed: 9/13/2015 © 2000-2017 [employer]. [address] [place] [address]. All rights reserved. This information is not intended as a substitute for professional medical care. Always follow your healthcare professional's instructions.

## 2019-11-08 NOTE — PROGRESS NOTES
"Subjective:       Patient ID: Wilver Lara is a 39 y.o. male.    Vitals:  height is 5' 10" (1.778 m) and weight is 111.1 kg (245 lb). His temperature is 97.1 °F (36.2 °C). His blood pressure is 128/70 and his pulse is 68. His oxygen saturation is 98%.     Chief Complaint: Sinus Problem    Pt states for 2 days he has been having a post nasal drip, congestion and cough with sinus pressure and sneezing        Sinus Problem   This is a new problem. The current episode started in the past 7 days. The problem has been gradually worsening since onset. There has been no fever. Associated symptoms include congestion, coughing, sinus pressure and sneezing. Pertinent negatives include no chills, headaches, shortness of breath or sore throat. Past treatments include nothing.       Constitution: Negative for chills, fatigue and fever.   HENT: Positive for congestion, postnasal drip, sinus pain and sinus pressure. Negative for sore throat.    Neck: Negative for painful lymph nodes.   Cardiovascular: Negative for chest pain and leg swelling.   Eyes: Negative for double vision and blurred vision.   Respiratory: Positive for cough. Negative for shortness of breath.    Gastrointestinal: Negative for nausea, vomiting and diarrhea.   Genitourinary: Negative for dysuria, frequency and urgency.   Musculoskeletal: Negative for joint pain, joint swelling, muscle cramps and muscle ache.   Skin: Negative for color change, pale and rash.   Allergic/Immunologic: Positive for sneezing. Negative for seasonal allergies.   Neurological: Negative for dizziness, history of vertigo, light-headedness, passing out and headaches.   Hematologic/Lymphatic: Negative for swollen lymph nodes, easy bruising/bleeding and history of blood clots. Does not bruise/bleed easily.   Psychiatric/Behavioral: Negative for nervous/anxious, sleep disturbance and depression. The patient is not nervous/anxious.        Objective:      Physical Exam   Constitutional: He " is oriented to person, place, and time. He appears well-developed and well-nourished. He is cooperative.  Non-toxic appearance. He does not have a sickly appearance. He does not appear ill. No distress.   HENT:   Head: Normocephalic and atraumatic.   Right Ear: Hearing, tympanic membrane, external ear and ear canal normal.   Left Ear: Hearing, tympanic membrane, external ear and ear canal normal.   Nose: No mucosal edema, rhinorrhea or nasal deformity. No epistaxis. Right sinus exhibits maxillary sinus tenderness and frontal sinus tenderness. Left sinus exhibits maxillary sinus tenderness and frontal sinus tenderness.   Mouth/Throat: Uvula is midline, oropharynx is clear and moist and mucous membranes are normal. No trismus in the jaw. Normal dentition. No uvula swelling. No oropharyngeal exudate, posterior oropharyngeal edema or posterior oropharyngeal erythema.   Eyes: Conjunctivae and lids are normal. No scleral icterus.   Neck: Trachea normal, full passive range of motion without pain and phonation normal. Neck supple. No neck rigidity. No edema and no erythema present.   Cardiovascular: Normal rate, regular rhythm, normal heart sounds, intact distal pulses and normal pulses.   Pulmonary/Chest: Effort normal and breath sounds normal. No respiratory distress. He has no decreased breath sounds. He has no rhonchi.   Abdominal: Normal appearance.   Musculoskeletal: Normal range of motion. He exhibits no edema or deformity.   Neurological: He is alert and oriented to person, place, and time. He exhibits normal muscle tone. Coordination normal.   Skin: Skin is warm, dry, intact, not diaphoretic and not pale.   Psychiatric: He has a normal mood and affect. His speech is normal and behavior is normal. Judgment and thought content normal. Cognition and memory are normal.   Nursing note and vitals reviewed.        Assessment:       1. Viral URI with cough        Plan:       Increase fluids and rest  OTC  treatments  Steroid avoided due to hx of bad reaction  F/u with primary in 1 week if still feeling poorly    Viral URI with cough  -     benzonatate (TESSALON PERLES) 100 MG capsule; Take 2 capsules (200 mg total) by mouth 3 (three) times daily as needed for Cough.  Dispense: 20 capsule; Refill: 0         Patient Instructions   Tessalon perles for cough  Please monitor fevers closely, alternate Motrin and Tylenol as needed for fevers  Increase fluids and rest  If unable to tolerate fluids or foods please go to the emergency department  theraflu for symptoms as discussed or can use options listed below  Follow up with primary care in 1 week    Please drink plenty of fluids.  Please get plenty of rest.  Please return here or go to the Emergency Department for any concerns or worsening of condition.  If you were given wait & see antibiotics, please wait 3-5 days before taking them, and only take them if your symptoms have worsened or not improved.  If you do begin taking the antibiotics, please take them to completion.  If you were prescribed antibiotics, please take them to completion.  If you were prescribed a narcotic medication, do not drive or operate heavy equipment or machinery while taking these medications.  If you do not have Hypertension or any history of palpitations, it is ok to take over the counter Sudafed or Mucinex D or Allegra-D or Claritin-D or Zyrtec-D.  If you do take one of the above, it is ok to combine that with plain over the counter Mucinex or Allegra or Claritin or Zyrtec.  If for example you are taking Zyrtec -D, you can combine that with Mucinex, but not Mucinex-D.  If you are taking Mucinex-D, you can combine that with plain Allegra or Claritin or Zyrtec.   If you do have Hypertension or palpitations, it is safe to take Coricidin HBP for relief of sinus symptoms.  We recommend you take over the counter Flonase (Fluticasone) or another nasally inhaled steroid unless you are already taking  one.  Nasal irrigation with a saline spray or Netti Pot like device per their directions is also recommended.  If not allergic, please take over the counter Tylenol (Acetaminophen) and/or Motrin (Ibuprofen) as directed for control of pain and/or fever.  Please follow up with your primary care doctor or specialist as needed.    If you  smoke, please stop smoking.        Viral Upper Respiratory Illness (Adult)  You have a viral upper respiratory illness (URI), which is another term for the common cold. This illness is contagious during the first few days. It is spread through the air by coughing and sneezing. It may also be spread by direct contact (touching the sick person and then touching your own eyes, nose, or mouth). Frequent handwashing will decrease risk of spread. Most viral illnesses go away within 7 to 10 days with rest and simple home remedies. Sometimes the illness may last for several weeks. Antibiotics will not kill a virus, and they are generally not prescribed for this condition.    Home care  · If symptoms are severe, rest at home for the first 2 to 3 days. When you resume activity, don't let yourself get too tired.  · Avoid being exposed to cigarette smoke (yours or others).  · You may use acetaminophen or ibuprofen to control pain and fever, unless another medicine was prescribed. (Note: If you have chronic liver or kidney disease, have ever had a stomach ulcer or gastrointestinal bleeding, or are taking blood-thinning medicines, talk with your healthcare provider before using these medicines.) Aspirin should never be given to anyone under 18 years of age who is ill with a viral infection or fever. It may cause severe liver or brain damage.  · Your appetite may be poor, so a light diet is fine. Avoid dehydration by drinking 6 to 8 glasses of fluids per day (water, soft drinks, juices, tea, or soup). Extra fluids will help loosen secretions in the nose and lungs.  · Over-the-counter cold medicines  will not shorten the length of time youre sick, but they may be helpful for the following symptoms: cough, sore throat, and nasal and sinus congestion. (Note: Do not use decongestants if you have high blood pressure.)  Follow-up care  Follow up with your healthcare provider, or as advised.  When to seek medical advice  Call your healthcare provider right away if any of these occur:  · Cough with lots of colored sputum (mucus)  · Severe headache; face, neck, or ear pain  · Difficulty swallowing due to throat pain  · Fever of 100.4°F (38°C)  Call 911, or get immediate medical care  Call emergency services right away if any of these occur:  · Chest pain, shortness of breath, wheezing, or difficulty breathing  · Coughing up blood  · Inability to swallow due to throat pain  Date Last Reviewed: 9/13/2015  © 7159-1156 [employer]. [address] [place] [address], [place] [address]. All rights reserved. This information is not intended as a substitute for professional medical care. Always follow your healthcare professional's instructions.

## 2019-11-26 ENCOUNTER — HOSPITAL ENCOUNTER (OUTPATIENT)
Dept: RADIOLOGY | Facility: HOSPITAL | Age: 39
Discharge: HOME | End: 2019-11-26
Attending: NURSE PRACTITIONER
Payer: MEDICAID

## 2019-11-26 DIAGNOSIS — C64.2 RENAL CELL CARCINOMA OF LEFT KIDNEY: ICD-10-CM

## 2019-11-26 PROCEDURE — 76700 US ABDOMEN COMPLETE: ICD-10-PCS | Mod: 26,,, | Performed by: RADIOLOGY

## 2019-11-26 PROCEDURE — 71046 X-RAY EXAM CHEST 2 VIEWS: CPT | Mod: 26,,, | Performed by: RADIOLOGY

## 2019-11-26 PROCEDURE — 76700 US EXAM ABDOM COMPLETE: CPT | Mod: 26,,, | Performed by: RADIOLOGY

## 2019-11-26 PROCEDURE — 71046 XR CHEST PA AND LATERAL: ICD-10-PCS | Mod: 26,,, | Performed by: RADIOLOGY

## 2019-11-26 PROCEDURE — 71046 X-RAY EXAM CHEST 2 VIEWS: CPT | Mod: TC,FY

## 2019-11-26 PROCEDURE — 76700 US EXAM ABDOM COMPLETE: CPT | Mod: TC

## 2019-12-03 ENCOUNTER — OFFICE VISIT (OUTPATIENT)
Dept: UROLOGY | Facility: CLINIC | Age: 39
End: 2019-12-03
Payer: MEDICAID

## 2019-12-03 VITALS
BODY MASS INDEX: 35.07 KG/M2 | DIASTOLIC BLOOD PRESSURE: 83 MMHG | WEIGHT: 245 LBS | HEIGHT: 70 IN | HEART RATE: 88 BPM | SYSTOLIC BLOOD PRESSURE: 124 MMHG

## 2019-12-03 DIAGNOSIS — C64.2 RENAL CELL CARCINOMA OF LEFT KIDNEY: Primary | ICD-10-CM

## 2019-12-03 DIAGNOSIS — Z80.51 FAMILY HISTORY OF RENAL CELL CARCINOMA: ICD-10-CM

## 2019-12-03 LAB
BILIRUB SERPL-MCNC: NORMAL MG/DL
BLOOD URINE, POC: 50
COLOR, POC UA: YELLOW
GLUCOSE UR QL STRIP: NORMAL
KETONES UR QL STRIP: NORMAL
LEUKOCYTE ESTERASE URINE, POC: NORMAL
NITRITE, POC UA: NORMAL
PH, POC UA: 5
PROTEIN, POC: NORMAL
SPECIFIC GRAVITY, POC UA: 1.02
UROBILINOGEN, POC UA: NORMAL

## 2019-12-03 PROCEDURE — 81002 URINALYSIS NONAUTO W/O SCOPE: CPT | Mod: S$GLB,,, | Performed by: UROLOGY

## 2019-12-03 PROCEDURE — 99214 PR OFFICE/OUTPT VISIT, EST, LEVL IV, 30-39 MIN: ICD-10-PCS | Mod: 25,S$GLB,, | Performed by: UROLOGY

## 2019-12-03 PROCEDURE — 81002 POCT URINE DIPSTICK WITHOUT MICROSCOPE: ICD-10-PCS | Mod: S$GLB,,, | Performed by: UROLOGY

## 2019-12-03 PROCEDURE — 99214 OFFICE O/P EST MOD 30 MIN: CPT | Mod: 25,S$GLB,, | Performed by: UROLOGY

## 2019-12-03 NOTE — PROGRESS NOTES
Subjective:      Wilver Lara is a 39 y.o. male who returns today regarding his renal cell carcinoma    Renal cell carcinoma status post nephrectomy approximately 4 years ago.  He's doing well and has no complaints.  No symptoms of local recurrence and no symptoms of metastatic disease.       Interestingly his mother just underwent nephrectomy at [facility] by Dr Alfredo Gibbons also for renal cell carcinoma approx 1 year ago.  There is no known family history of von Hippel-Lindau syndrome nor tuberous sclerosis.  No other family members have renal cell carcinoma.  He's not sure of the type of renal cell carcinoma his mother had.      The following portions of the patient's history were reviewed and updated as appropriate: allergies, current medications, past family history, past medical history, past social history, past surgical history and problem list.    Review of Systems  Pertinent items are noted in HPI.  A comprehensive multipoint review of systems was negative except as otherwise stated in the HPI.     Objective:   Vitals: There were no vitals taken for this visit.    Physical Exam   General: alert and oriented, no acute distress  Respiratory: Symmetric expansion, non-labored breathing  Cardiovascular: regular rate and rhythm, no peripheral edema  Abdomen:  non distended  Skin: normal coloration and turgor, no rashes, no suspicious skin lesions noted  Neuro: no gross deficits  Psych: normal judgment and insight, normal mood/affect and non-anxious    Lab Review   Urinalysis demonstrates no specimen    Lab Results   Component Value Date    WBC 6.83 11/26/2019    HGB 16.2 11/26/2019    HCT 50.5 11/26/2019    MCV 91 11/26/2019     11/26/2019     Lab Results   Component Value Date    CREATININE 1.1 11/26/2019    BUN 11 11/26/2019     No results found for: PSA, PSADIAG    Imaging     Impression      1. No appreciable suspicious findings in the left nephrectomy bed status post left nephrectomy,  unchanged.  Right kidney is grossly unremarkable.  2. 4 mm gallbladder polyp has been stable over 4 years.  No follow-up indicated.  3. Cholelithiasis without sonographic evidence of acute cholecystitis.  4. Mild diffuse hepatic steatosis, unchanged.      Electronically signed by: Ok Galdamez  Date: 11/26/2019  Time: 11:25   Impression      1. No acute cardiopulmonary process appreciated.      Electronically signed by: Ok Galdamez  Date: 11/26/2019  Time: 09:07       Assessment and Plan:   1. Renal cell carcinoma of left kidney  grade I clear cell [id number] sp left nephrectomy YANA*4 years  rtc 1yr with CXR, CT, cbc and CMP    2. Family history of renal cell carcinoma  Will have him see genetic counselor at [facility].

## 2019-12-05 ENCOUNTER — TELEPHONE (OUTPATIENT)
Dept: UROGYNECOLOGY | Facility: CLINIC | Age: 39
End: 2019-12-05

## 2019-12-05 NOTE — TELEPHONE ENCOUNTER
Called and spoke to the patient and he was not able to take the number so I galled back and left detail message with the number to genetic counseling at [facility].

## 2021-08-26 DIAGNOSIS — C64.9 RENAL CELL CARCINOMA, UNSPECIFIED LATERALITY: Primary | ICD-10-CM

## 2021-09-14 ENCOUNTER — HOSPITAL ENCOUNTER (OUTPATIENT)
Dept: RADIOLOGY | Facility: OTHER | Age: 41
Discharge: HOME | End: 2021-09-14
Attending: NURSE PRACTITIONER
Payer: MEDICAID

## 2021-09-14 DIAGNOSIS — C64.9 RENAL CELL CARCINOMA, UNSPECIFIED LATERALITY: ICD-10-CM

## 2021-09-14 PROCEDURE — 74178 CT UROGRAM ABD PELVIS W WO: ICD-10-PCS | Mod: 26,,, | Performed by: RADIOLOGY

## 2021-09-14 PROCEDURE — 74178 CT ABD&PLV WO CNTR FLWD CNTR: CPT | Mod: 26,,, | Performed by: RADIOLOGY

## 2021-09-14 PROCEDURE — 25500020 PHARM REV CODE 255: Performed by: NURSE PRACTITIONER

## 2021-09-14 PROCEDURE — 71046 X-RAY EXAM CHEST 2 VIEWS: CPT | Mod: TC,FY

## 2021-09-14 PROCEDURE — 74178 CT ABD&PLV WO CNTR FLWD CNTR: CPT | Mod: TC

## 2021-09-14 PROCEDURE — 71046 X-RAY EXAM CHEST 2 VIEWS: CPT | Mod: 26,,, | Performed by: RADIOLOGY

## 2021-09-14 PROCEDURE — 71046 XR CHEST PA AND LATERAL: ICD-10-PCS | Mod: 26,,, | Performed by: RADIOLOGY

## 2021-09-14 RX ADMIN — IOHEXOL 125 ML: 350 INJECTION, SOLUTION INTRAVENOUS at 01:09

## 2021-09-28 ENCOUNTER — OFFICE VISIT (OUTPATIENT)
Dept: UROLOGY | Facility: CLINIC | Age: 41
End: 2021-09-28
Payer: MEDICAID

## 2021-09-28 VITALS
WEIGHT: 240.06 LBS | HEART RATE: 78 BPM | DIASTOLIC BLOOD PRESSURE: 83 MMHG | BODY MASS INDEX: 34.37 KG/M2 | HEIGHT: 70 IN | SYSTOLIC BLOOD PRESSURE: 123 MMHG

## 2021-09-28 DIAGNOSIS — C64.2 RENAL CELL CARCINOMA OF LEFT KIDNEY: ICD-10-CM

## 2021-09-28 DIAGNOSIS — R10.9 FLANK PAIN: Primary | ICD-10-CM

## 2021-09-28 PROCEDURE — 99214 PR OFFICE/OUTPT VISIT, EST, LEVL IV, 30-39 MIN: ICD-10-PCS | Mod: S$PBB,,, | Performed by: UROLOGY

## 2021-09-28 PROCEDURE — 99999 PR PBB SHADOW E&M-EST. PATIENT-LVL III: ICD-10-PCS | Mod: PBBFAC,,, | Performed by: UROLOGY

## 2021-09-28 PROCEDURE — 99214 OFFICE O/P EST MOD 30 MIN: CPT | Mod: S$PBB,,, | Performed by: UROLOGY

## 2021-09-28 PROCEDURE — 99999 PR PBB SHADOW E&M-EST. PATIENT-LVL III: CPT | Mod: PBBFAC,,, | Performed by: UROLOGY

## 2021-09-28 PROCEDURE — 99213 OFFICE O/P EST LOW 20 MIN: CPT | Mod: PBBFAC | Performed by: UROLOGY

## 2021-11-04 ENCOUNTER — HOSPITAL ENCOUNTER (OUTPATIENT)
Dept: RADIOLOGY | Facility: OTHER | Age: 41
Discharge: HOME | End: 2021-11-04
Attending: STUDENT IN AN ORGANIZED HEALTH CARE EDUCATION/TRAINING PROGRAM
Payer: MEDICAID

## 2021-11-04 DIAGNOSIS — R10.9 FLANK PAIN: ICD-10-CM

## 2021-11-04 PROCEDURE — 78708 K FLOW/FUNCT IMAGE W/DRUG: CPT | Mod: TC

## 2021-11-04 PROCEDURE — A9562 TC99M MERTIATIDE: HCPCS

## 2021-11-04 PROCEDURE — 78708 K FLOW/FUNCT IMAGE W/DRUG: CPT | Mod: 26,,, | Performed by: RADIOLOGY

## 2021-11-04 PROCEDURE — 78708 NM RENOGRAM WITH LASIX: ICD-10-PCS | Mod: 26,,, | Performed by: RADIOLOGY

## 2021-11-04 PROCEDURE — 63600175 PHARM REV CODE 636 W HCPCS: Performed by: STUDENT IN AN ORGANIZED HEALTH CARE EDUCATION/TRAINING PROGRAM

## 2021-11-04 RX ORDER — FUROSEMIDE 10 MG/ML
40 INJECTION INTRAMUSCULAR; INTRAVENOUS ONCE
Status: COMPLETED | OUTPATIENT
Start: 2021-11-04 | End: 2021-11-04

## 2021-11-04 RX ADMIN — FUROSEMIDE 40 MG: 10 INJECTION, SOLUTION INTRAMUSCULAR; INTRAVENOUS at 10:11

## 2023-01-20 NOTE — MR AVS SNAPSHOT
[facility]  [address]  [facility] [address]  Phone: [phone]  Fax: [phone]                  Wilver Lara   2017 3:20 PM   Office Visit    Description:  Male : 1980   Provider:  Cristiano Medina MD   Department:  [facility]           Reason for Visit     Rash           Diagnoses this Visit        Comments    Difficulty concentrating    -  Primary     Psoriasis                To Do List           Goals (5 Years of Data)     None       These Medications        Disp Refills Start End    clobetasol (TEMOVATE) 0.05 % cream 15 g 1 2017    Apply topically 2 (two) times daily. - Topical (Top)    Pharmacy: [facility] - [place], [place] - [address] Ph #: [phone]       predniSONE (DELTASONE) 10 MG tablet 22 tablet 0 2017    Take 1 tablet (10 mg total) by mouth once daily. 4 tabs/day x4 days then 2 tab/day for 4 days - Oral    Pharmacy: [facility] [place] [place] [facility] [address] [facility] [address] Ph #: [phone]         [facility]     [facility] - 24/7 Assistance  Registered nurses in the [facility] provide clinical advisement, health education, appointment booking, and other advisory services.  Call for this free service at [phone].             Medications           Message regarding Medications     Verify the changes and/or additions to your medication regime listed below are the same as discussed with your clinician today.  If any of these changes or additions are incorrect, please notify your healthcare provider.        START taking these NEW medications        Refills    predniSONE (DELTASONE) 10 MG tablet 0    Sig: Take 1 tablet (10 mg total) by mouth once daily. 4 tabs/day x4 days then 2 tab/day for 4 days    Class: Normal    Route: Oral           Verify that the below list of medications is an  "accurate representation of the medications you are currently taking.  If none reported, the list may be blank. If incorrect, please contact your healthcare provider. Carry this list with you in case of emergency.           Current Medications     fluticasone (FLONASE) 50 mcg/actuation nasal spray 1 spray by Each Nare route once daily.    clobetasol (TEMOVATE) 0.05 % cream Apply topically 2 (two) times daily.    lisinopril (PRINIVIL,ZESTRIL) 5 MG tablet Take 1 tablet (5 mg total) by mouth once daily.    loratadine (CLARITIN) 10 mg tablet Take 10 mg by mouth once daily.    predniSONE (DELTASONE) 10 MG tablet Take 1 tablet (10 mg total) by mouth once daily. 4 tabs/day x4 days then 2 tab/day for 4 days    silver sulfADIAZINE 1% (SILVADENE) 1 % cream Apply topically 2 (two) times daily.           Clinical Reference Information           Vital Signs - Last Recorded  Most recent update: 1/12/2017  3:11 PM by Zahira Sandoval MA    BP Pulse Ht Wt SpO2 BMI    118/84 83 5' 10" (1.778 m) 117.7 kg (259 lb 7.7 oz) 97% 37.23 kg/m2      Blood Pressure          Most Recent Value    BP  118/84      Allergies as of 1/12/2017     No Known Allergies      Immunizations Administered on Date of Encounter - 1/12/2017     None      Orders Placed During Today's Visit      Normal Orders This Visit    Ambulatory consult to Psychiatry     Ambulatory referral to Dermatology       " A-T Advancement Flap Text: The defect edges were debeveled with a #15 scalpel blade.  Given the location of the defect, shape of the defect and the proximity to free margins an A-T advancement flap was deemed most appropriate.  Using a sterile surgical marker, an appropriate advancement flap was drawn incorporating the defect and placing the expected incisions within the relaxed skin tension lines where possible.    The area thus outlined was incised deep to adipose tissue with a #15 scalpel blade.  The skin margins were undermined to an appropriate distance in all directions utilizing iris scissors.